# Patient Record
Sex: MALE | Race: WHITE | Employment: OTHER | URBAN - METROPOLITAN AREA
[De-identification: names, ages, dates, MRNs, and addresses within clinical notes are randomized per-mention and may not be internally consistent; named-entity substitution may affect disease eponyms.]

---

## 2020-06-07 ENCOUNTER — HOSPITAL ENCOUNTER (INPATIENT)
Age: 75
LOS: 4 days | Discharge: HOME | DRG: 871 | End: 2020-06-11
Attending: EMERGENCY MEDICINE | Admitting: STUDENT IN AN ORGANIZED HEALTH CARE EDUCATION/TRAINING PROGRAM
Payer: MEDICARE

## 2020-06-07 ENCOUNTER — APPOINTMENT (OUTPATIENT)
Dept: ULTRASOUND IMAGING | Age: 75
DRG: 871 | End: 2020-06-07
Payer: MEDICARE

## 2020-06-07 ENCOUNTER — APPOINTMENT (OUTPATIENT)
Dept: CT IMAGING | Age: 75
DRG: 871 | End: 2020-06-07
Payer: MEDICARE

## 2020-06-07 PROBLEM — K85.10 ACUTE BILIARY PANCREATITIS: Status: ACTIVE | Noted: 2020-06-07

## 2020-06-07 PROBLEM — K85.90 ACUTE PANCREATITIS WITHOUT INFECTION OR NECROSIS: Status: ACTIVE | Noted: 2020-06-07

## 2020-06-07 PROBLEM — K85.90 ACUTE PANCREATITIS: Status: ACTIVE | Noted: 2020-06-07

## 2020-06-07 LAB
ACETAMINOPHEN LEVEL: <5 MCG/ML (ref 10–30)
ALBUMIN SERPL-MCNC: 4.1 G/DL (ref 3.5–5.2)
ALP BLD-CCNC: 214 U/L (ref 40–129)
ALT SERPL-CCNC: 167 U/L (ref 0–40)
AMORPHOUS: ABNORMAL
AMPHETAMINE SCREEN, URINE: NOT DETECTED
ANION GAP SERPL CALCULATED.3IONS-SCNC: 11 MMOL/L (ref 7–16)
AST SERPL-CCNC: 221 U/L (ref 0–39)
BACTERIA: ABNORMAL /HPF
BARBITURATE SCREEN URINE: NOT DETECTED
BASOPHILS ABSOLUTE: 0 E9/L (ref 0–0.2)
BASOPHILS RELATIVE PERCENT: 0 % (ref 0–2)
BENZODIAZEPINE SCREEN, URINE: NOT DETECTED
BILIRUB SERPL-MCNC: 4.3 MG/DL (ref 0–1.2)
BILIRUBIN URINE: ABNORMAL
BLOOD, URINE: ABNORMAL
BUN BLDV-MCNC: 26 MG/DL (ref 8–23)
BURR CELLS: ABNORMAL
CALCIUM SERPL-MCNC: 9.6 MG/DL (ref 8.6–10.2)
CANNABINOID SCREEN URINE: NOT DETECTED
CHLORIDE BLD-SCNC: 104 MMOL/L (ref 98–107)
CHOLESTEROL, TOTAL: 106 MG/DL (ref 0–199)
CLARITY: CLEAR
CO2: 23 MMOL/L (ref 22–29)
COCAINE METABOLITE SCREEN URINE: NOT DETECTED
COLOR: ABNORMAL
CREAT SERPL-MCNC: 1.5 MG/DL (ref 0.7–1.2)
EOSINOPHILS ABSOLUTE: 0 E9/L (ref 0.05–0.5)
EOSINOPHILS RELATIVE PERCENT: 0 % (ref 0–6)
EPITHELIAL CELLS, UA: ABNORMAL /HPF
ETHANOL: <10 MG/DL (ref 0–0.08)
FENTANYL SCREEN, URINE: NOT DETECTED
FERRITIN: 222 NG/ML
GFR AFRICAN AMERICAN: 56
GFR NON-AFRICAN AMERICAN: 46 ML/MIN/1.73
GLUCOSE BLD-MCNC: 106 MG/DL (ref 74–99)
GLUCOSE URINE: NEGATIVE MG/DL
HCT VFR BLD CALC: 45.3 % (ref 37–54)
HDLC SERPL-MCNC: 40 MG/DL
HEMOGLOBIN: 14.6 G/DL (ref 12.5–16.5)
INR BLD: 1.3
IRON SATURATION: 7 % (ref 20–55)
IRON: 16 MCG/DL (ref 59–158)
KETONES, URINE: NEGATIVE MG/DL
LACTIC ACID, SEPSIS: 2.4 MMOL/L (ref 0.5–1.9)
LDL CHOLESTEROL CALCULATED: 49 MG/DL (ref 0–99)
LEUKOCYTE ESTERASE, URINE: NEGATIVE
LIPASE: 2004 U/L (ref 13–60)
LYMPHOCYTES ABSOLUTE: 0.14 E9/L (ref 1.5–4)
LYMPHOCYTES RELATIVE PERCENT: 3.5 % (ref 20–42)
Lab: ABNORMAL
MCH RBC QN AUTO: 28.3 PG (ref 26–35)
MCHC RBC AUTO-ENTMCNC: 32.2 % (ref 32–34.5)
MCV RBC AUTO: 88 FL (ref 80–99.9)
METHADONE SCREEN, URINE: NOT DETECTED
MONOCYTES ABSOLUTE: 0 E9/L (ref 0.1–0.95)
MONOCYTES RELATIVE PERCENT: 0 % (ref 2–12)
MUCUS: PRESENT /LPF
NEUTROPHILS ABSOLUTE: 3.3 E9/L (ref 1.8–7.3)
NEUTROPHILS RELATIVE PERCENT: 96.5 % (ref 43–80)
NITRITE, URINE: NEGATIVE
NUCLEATED RED BLOOD CELLS: 0 /100 WBC
OPIATE SCREEN URINE: POSITIVE
OXYCODONE URINE: NOT DETECTED
PDW BLD-RTO: 15.2 FL (ref 11.5–15)
PH UA: 5.5 (ref 5–9)
PHENCYCLIDINE SCREEN URINE: NOT DETECTED
PLATELET # BLD: 146 E9/L (ref 130–450)
PMV BLD AUTO: 11 FL (ref 7–12)
POIKILOCYTES: ABNORMAL
POTASSIUM SERPL-SCNC: 4.1 MMOL/L (ref 3.5–5)
PROTEIN UA: NEGATIVE MG/DL
PROTHROMBIN TIME: 16 SEC (ref 9.3–12.4)
RBC # BLD: 5.15 E12/L (ref 3.8–5.8)
RBC UA: ABNORMAL /HPF (ref 0–2)
RENAL EPITHELIAL, UA: ABNORMAL /HPF
SALICYLATE, SERUM: <0.3 MG/DL (ref 0–30)
SODIUM BLD-SCNC: 138 MMOL/L (ref 132–146)
SPECIFIC GRAVITY UA: <=1.005 (ref 1–1.03)
T4 TOTAL: 6.1 MCG/DL (ref 4.5–11.7)
TOTAL IRON BINDING CAPACITY: 234 MCG/DL (ref 250–450)
TOTAL PROTEIN: 7 G/DL (ref 6.4–8.3)
TRICYCLIC ANTIDEPRESSANTS SCREEN SERUM: NEGATIVE NG/ML
TRIGL SERPL-MCNC: 87 MG/DL (ref 0–149)
TROPONIN: <0.01 NG/ML (ref 0–0.03)
TSH SERPL DL<=0.05 MIU/L-ACNC: 0.27 UIU/ML (ref 0.27–4.2)
UROBILINOGEN, URINE: 0.2 E.U./DL
VLDLC SERPL CALC-MCNC: 17 MG/DL
WBC # BLD: 3.4 E9/L (ref 4.5–11.5)
WBC UA: ABNORMAL /HPF (ref 0–5)

## 2020-06-07 PROCEDURE — 86301 IMMUNOASSAY TUMOR CA 19-9: CPT

## 2020-06-07 PROCEDURE — 2060000000 HC ICU INTERMEDIATE R&B

## 2020-06-07 PROCEDURE — 96376 TX/PRO/DX INJ SAME DRUG ADON: CPT

## 2020-06-07 PROCEDURE — 93005 ELECTROCARDIOGRAM TRACING: CPT | Performed by: EMERGENCY MEDICINE

## 2020-06-07 PROCEDURE — 6360000002 HC RX W HCPCS: Performed by: EMERGENCY MEDICINE

## 2020-06-07 PROCEDURE — 96374 THER/PROPH/DIAG INJ IV PUSH: CPT

## 2020-06-07 PROCEDURE — 76705 ECHO EXAM OF ABDOMEN: CPT

## 2020-06-07 PROCEDURE — 85025 COMPLETE CBC W/AUTO DIFF WBC: CPT

## 2020-06-07 PROCEDURE — 83550 IRON BINDING TEST: CPT

## 2020-06-07 PROCEDURE — 80053 COMPREHEN METABOLIC PANEL: CPT

## 2020-06-07 PROCEDURE — 80074 ACUTE HEPATITIS PANEL: CPT

## 2020-06-07 PROCEDURE — 6360000004 HC RX CONTRAST MEDICATION: Performed by: RADIOLOGY

## 2020-06-07 PROCEDURE — 86038 ANTINUCLEAR ANTIBODIES: CPT

## 2020-06-07 PROCEDURE — 82728 ASSAY OF FERRITIN: CPT

## 2020-06-07 PROCEDURE — 82787 IGG 1 2 3 OR 4 EACH: CPT

## 2020-06-07 PROCEDURE — 85610 PROTHROMBIN TIME: CPT

## 2020-06-07 PROCEDURE — 74177 CT ABD & PELVIS W/CONTRAST: CPT

## 2020-06-07 PROCEDURE — 82105 ALPHA-FETOPROTEIN SERUM: CPT

## 2020-06-07 PROCEDURE — 84484 ASSAY OF TROPONIN QUANT: CPT

## 2020-06-07 PROCEDURE — 99285 EMERGENCY DEPT VISIT HI MDM: CPT

## 2020-06-07 PROCEDURE — 82103 ALPHA-1-ANTITRYPSIN TOTAL: CPT

## 2020-06-07 PROCEDURE — 6360000002 HC RX W HCPCS

## 2020-06-07 PROCEDURE — 6360000002 HC RX W HCPCS: Performed by: STUDENT IN AN ORGANIZED HEALTH CARE EDUCATION/TRAINING PROGRAM

## 2020-06-07 PROCEDURE — 96375 TX/PRO/DX INJ NEW DRUG ADDON: CPT

## 2020-06-07 PROCEDURE — 99222 1ST HOSP IP/OBS MODERATE 55: CPT | Performed by: INTERNAL MEDICINE

## 2020-06-07 PROCEDURE — 84436 ASSAY OF TOTAL THYROXINE: CPT

## 2020-06-07 PROCEDURE — 86255 FLUORESCENT ANTIBODY SCREEN: CPT

## 2020-06-07 PROCEDURE — 84443 ASSAY THYROID STIM HORMONE: CPT

## 2020-06-07 PROCEDURE — 82784 ASSAY IGA/IGD/IGG/IGM EACH: CPT

## 2020-06-07 PROCEDURE — 83690 ASSAY OF LIPASE: CPT

## 2020-06-07 PROCEDURE — 81001 URINALYSIS AUTO W/SCOPE: CPT

## 2020-06-07 PROCEDURE — 83540 ASSAY OF IRON: CPT

## 2020-06-07 PROCEDURE — 80061 LIPID PANEL: CPT

## 2020-06-07 PROCEDURE — 82378 CARCINOEMBRYONIC ANTIGEN: CPT

## 2020-06-07 PROCEDURE — 80307 DRUG TEST PRSMV CHEM ANLYZR: CPT

## 2020-06-07 PROCEDURE — 2580000003 HC RX 258: Performed by: STUDENT IN AN ORGANIZED HEALTH CARE EDUCATION/TRAINING PROGRAM

## 2020-06-07 PROCEDURE — 83605 ASSAY OF LACTIC ACID: CPT

## 2020-06-07 PROCEDURE — 36415 COLL VENOUS BLD VENIPUNCTURE: CPT

## 2020-06-07 PROCEDURE — 2580000003 HC RX 258: Performed by: EMERGENCY MEDICINE

## 2020-06-07 PROCEDURE — G0480 DRUG TEST DEF 1-7 CLASSES: HCPCS

## 2020-06-07 RX ORDER — AMLODIPINE BESYLATE 5 MG/1
5 TABLET ORAL DAILY
Status: ON HOLD | COMMUNITY
Start: 2016-05-31 | End: 2020-06-07

## 2020-06-07 RX ORDER — ACETAMINOPHEN 325 MG/1
650 TABLET ORAL EVERY 6 HOURS PRN
Status: DISCONTINUED | OUTPATIENT
Start: 2020-06-07 | End: 2020-06-11 | Stop reason: HOSPADM

## 2020-06-07 RX ORDER — ERGOCALCIFEROL 1.25 MG/1
5000 CAPSULE ORAL DAILY
COMMUNITY

## 2020-06-07 RX ORDER — SODIUM CHLORIDE 0.9 % (FLUSH) 0.9 %
10 SYRINGE (ML) INJECTION PRN
Status: DISCONTINUED | OUTPATIENT
Start: 2020-06-07 | End: 2020-06-11 | Stop reason: HOSPADM

## 2020-06-07 RX ORDER — HYDROXYCHLOROQUINE SULFATE 200 MG/1
TABLET, FILM COATED ORAL
COMMUNITY
Start: 2017-10-28

## 2020-06-07 RX ORDER — ACETAMINOPHEN 650 MG/1
650 SUPPOSITORY RECTAL EVERY 6 HOURS PRN
Status: DISCONTINUED | OUTPATIENT
Start: 2020-06-07 | End: 2020-06-11 | Stop reason: HOSPADM

## 2020-06-07 RX ORDER — 0.9 % SODIUM CHLORIDE 0.9 %
500 INTRAVENOUS SOLUTION INTRAVENOUS ONCE
Status: COMPLETED | OUTPATIENT
Start: 2020-06-07 | End: 2020-06-07

## 2020-06-07 RX ORDER — HYDROCHLOROTHIAZIDE 25 MG/1
25 TABLET ORAL DAILY
Status: ON HOLD | COMMUNITY
Start: 2016-05-31 | End: 2020-06-07

## 2020-06-07 RX ORDER — MORPHINE SULFATE 2 MG/ML
2 INJECTION, SOLUTION INTRAMUSCULAR; INTRAVENOUS
Status: DISCONTINUED | OUTPATIENT
Start: 2020-06-07 | End: 2020-06-11 | Stop reason: HOSPADM

## 2020-06-07 RX ORDER — SILDENAFIL 50 MG/1
50 TABLET, FILM COATED ORAL PRN
COMMUNITY

## 2020-06-07 RX ORDER — MORPHINE SULFATE 2 MG/ML
INJECTION, SOLUTION INTRAMUSCULAR; INTRAVENOUS
Status: COMPLETED
Start: 2020-06-07 | End: 2020-06-07

## 2020-06-07 RX ORDER — CHLORAL HYDRATE 500 MG
3000 CAPSULE ORAL DAILY
COMMUNITY

## 2020-06-07 RX ORDER — TAMSULOSIN HYDROCHLORIDE 0.4 MG/1
0.4 CAPSULE ORAL 2 TIMES DAILY
Status: ON HOLD | COMMUNITY
Start: 2018-08-01 | End: 2020-06-07

## 2020-06-07 RX ORDER — SODIUM CHLORIDE, SODIUM LACTATE, POTASSIUM CHLORIDE, CALCIUM CHLORIDE 600; 310; 30; 20 MG/100ML; MG/100ML; MG/100ML; MG/100ML
INJECTION, SOLUTION INTRAVENOUS CONTINUOUS
Status: DISCONTINUED | OUTPATIENT
Start: 2020-06-07 | End: 2020-06-10 | Stop reason: ALTCHOICE

## 2020-06-07 RX ORDER — HEPARIN SODIUM 10000 [USP'U]/ML
5000 INJECTION, SOLUTION INTRAVENOUS; SUBCUTANEOUS EVERY 8 HOURS SCHEDULED
Status: DISCONTINUED | OUTPATIENT
Start: 2020-06-07 | End: 2020-06-11 | Stop reason: HOSPADM

## 2020-06-07 RX ORDER — MORPHINE SULFATE 2 MG/ML
2 INJECTION, SOLUTION INTRAMUSCULAR; INTRAVENOUS ONCE
Status: COMPLETED | OUTPATIENT
Start: 2020-06-07 | End: 2020-06-07

## 2020-06-07 RX ORDER — PANTOPRAZOLE SODIUM 40 MG/1
40 TABLET, DELAYED RELEASE ORAL
Status: DISCONTINUED | OUTPATIENT
Start: 2020-06-08 | End: 2020-06-08

## 2020-06-07 RX ORDER — DICLOFENAC SODIUM 75 MG/1
75 TABLET, DELAYED RELEASE ORAL DAILY
COMMUNITY

## 2020-06-07 RX ORDER — MORPHINE SULFATE 4 MG/ML
4 INJECTION, SOLUTION INTRAMUSCULAR; INTRAVENOUS
Status: DISCONTINUED | OUTPATIENT
Start: 2020-06-07 | End: 2020-06-11 | Stop reason: HOSPADM

## 2020-06-07 RX ORDER — SODIUM CHLORIDE 0.9 % (FLUSH) 0.9 %
10 SYRINGE (ML) INJECTION EVERY 12 HOURS SCHEDULED
Status: DISCONTINUED | OUTPATIENT
Start: 2020-06-07 | End: 2020-06-11 | Stop reason: HOSPADM

## 2020-06-07 RX ORDER — 0.9 % SODIUM CHLORIDE 0.9 %
1000 INTRAVENOUS SOLUTION INTRAVENOUS ONCE
Status: COMPLETED | OUTPATIENT
Start: 2020-06-07 | End: 2020-06-07

## 2020-06-07 RX ORDER — OMEPRAZOLE 20 MG/1
20 CAPSULE, DELAYED RELEASE ORAL DAILY
COMMUNITY

## 2020-06-07 RX ORDER — PSEUDOEPHEDRINE HCL 30 MG
1000 TABLET ORAL DAILY
COMMUNITY

## 2020-06-07 RX ORDER — ONDANSETRON 2 MG/ML
4 INJECTION INTRAMUSCULAR; INTRAVENOUS ONCE
Status: COMPLETED | OUTPATIENT
Start: 2020-06-07 | End: 2020-06-07

## 2020-06-07 RX ORDER — ONDANSETRON 2 MG/ML
4 INJECTION INTRAMUSCULAR; INTRAVENOUS EVERY 6 HOURS PRN
Status: DISCONTINUED | OUTPATIENT
Start: 2020-06-07 | End: 2020-06-11 | Stop reason: HOSPADM

## 2020-06-07 RX ORDER — OXYCODONE HYDROCHLORIDE 5 MG/1
5 CAPSULE ORAL EVERY 4 HOURS PRN
COMMUNITY

## 2020-06-07 RX ORDER — LISINOPRIL 40 MG/1
TABLET ORAL
COMMUNITY
Start: 2016-05-31

## 2020-06-07 RX ORDER — MULTIVITAMIN WITH IRON
250 TABLET ORAL DAILY
COMMUNITY

## 2020-06-07 RX ORDER — PROMETHAZINE HYDROCHLORIDE 25 MG/1
12.5 TABLET ORAL EVERY 6 HOURS PRN
Status: DISCONTINUED | OUTPATIENT
Start: 2020-06-07 | End: 2020-06-11 | Stop reason: HOSPADM

## 2020-06-07 RX ORDER — VITAMIN E 268 MG
400 CAPSULE ORAL DAILY
COMMUNITY

## 2020-06-07 RX ADMIN — MORPHINE SULFATE 2 MG: 2 INJECTION, SOLUTION INTRAMUSCULAR; INTRAVENOUS at 16:37

## 2020-06-07 RX ADMIN — SODIUM CHLORIDE 500 ML: 9 INJECTION, SOLUTION INTRAVENOUS at 13:44

## 2020-06-07 RX ADMIN — SODIUM CHLORIDE 1000 ML: 9 INJECTION, SOLUTION INTRAVENOUS at 14:49

## 2020-06-07 RX ADMIN — SODIUM CHLORIDE, POTASSIUM CHLORIDE, SODIUM LACTATE AND CALCIUM CHLORIDE: 600; 310; 30; 20 INJECTION, SOLUTION INTRAVENOUS at 18:42

## 2020-06-07 RX ADMIN — MORPHINE SULFATE 2 MG: 2 INJECTION, SOLUTION INTRAMUSCULAR; INTRAVENOUS at 19:15

## 2020-06-07 RX ADMIN — MORPHINE SULFATE 2 MG: 2 INJECTION, SOLUTION INTRAMUSCULAR; INTRAVENOUS at 13:51

## 2020-06-07 RX ADMIN — HEPARIN SODIUM 5000 UNITS: 10000 INJECTION INTRAVENOUS; SUBCUTANEOUS at 21:11

## 2020-06-07 RX ADMIN — IOPAMIDOL 110 ML: 755 INJECTION, SOLUTION INTRAVENOUS at 15:00

## 2020-06-07 RX ADMIN — ONDANSETRON 4 MG: 2 INJECTION INTRAMUSCULAR; INTRAVENOUS at 13:51

## 2020-06-07 RX ADMIN — SODIUM CHLORIDE, PRESERVATIVE FREE 10 ML: 5 INJECTION INTRAVENOUS at 21:03

## 2020-06-07 ASSESSMENT — PAIN DESCRIPTION - ORIENTATION
ORIENTATION: MID
ORIENTATION: ANTERIOR

## 2020-06-07 ASSESSMENT — PAIN SCALES - GENERAL
PAINLEVEL_OUTOF10: 6
PAINLEVEL_OUTOF10: 5
PAINLEVEL_OUTOF10: 6
PAINLEVEL_OUTOF10: 2
PAINLEVEL_OUTOF10: 3
PAINLEVEL_OUTOF10: 6

## 2020-06-07 ASSESSMENT — PAIN DESCRIPTION - FREQUENCY: FREQUENCY: INTERMITTENT

## 2020-06-07 ASSESSMENT — PAIN DESCRIPTION - DESCRIPTORS
DESCRIPTORS: SHARP
DESCRIPTORS: SHARP

## 2020-06-07 ASSESSMENT — PAIN DESCRIPTION - LOCATION
LOCATION: ABDOMEN
LOCATION: ABDOMEN

## 2020-06-07 ASSESSMENT — PAIN - FUNCTIONAL ASSESSMENT: PAIN_FUNCTIONAL_ASSESSMENT: ACTIVITIES ARE NOT PREVENTED

## 2020-06-07 ASSESSMENT — PAIN DESCRIPTION - PAIN TYPE
TYPE: ACUTE PAIN
TYPE: ACUTE PAIN

## 2020-06-07 NOTE — ED PROVIDER NOTES
HPI:  6/7/20,   Time: 1:31 PM EDT       Guille Cordova is a 69 y.o. male presenting to the ED for abdominal pain, beginning 2 days ago. The complaint has been intermittent, moderate in severity, and worsened by nothing. Patient is a very pleasant 72-year-old gentleman with no past medical history. No present previous abdominal surgeries; only previous knee surgeries. He states that beginning 2 days ago he began to have some crampy mid periumbilical abdominal pain felt a little bit bloated states the symptoms seem to wax and wane after dinner and got better in the middle of the night after he vomited. The next day he thought he developed a low-grade fever still has some cramping abdominal pain went to another hospital he states it was [facility].  He was worked up therapy did not had any further imaging they thought he had a \"stomach bug\" and he was discharged home. He was feeling better and then last night began having nausea and vomiting again. He had one bout of diarrhea this morning. He states the pain at this time is about a 5 out of 10 and periumbilical in nature. He has not had any fevers in the past 24 hours. He denies any urinary complaints. No dysuria urgency or frequency. He denies any back pain or flank pain. He denies any chest pain or shortness of breath. Review of Systems:   Pertinent positives and negatives are stated within HPI, all other systems reviewed and are negative.          --------------------------------------------- PAST HISTORY ---------------------------------------------  Past Medical History:  has a past medical history of Arthritis and Hypertension. Past Surgical History:  has a past surgical history that includes Tonsillectomy; fracture surgery; joint replacement; and eye surgery. Social History:  reports that he has quit smoking. He has never used smokeless tobacco. He reports previous alcohol use. He reports previous drug use.     Family History: Poikilocytes 1+     Bolivar Cells 1+    Microscopic Urinalysis   Result Value Ref Range    Mucus, UA Present (A) None Seen /LPF    WBC, UA 0-1 0 - 5 /HPF    RBC, UA 1-3 0 - 2 /HPF    Epithelial Cells, UA FEW /HPF    Renal Epithelial, UA RARE /HPF    Bacteria, UA MODERATE (A) None Seen /HPF    Amorphous, UA MODERATE    Urine Drug Screen   Result Value Ref Range    Drug Screen Comment: see below        RADIOLOGY:  Interpreted by Radiologist.  US GALLBLADDER RUQ   Final Result   Moderate distention of the gallbladder with out evidence of   cholecystitis. CT ABDOMEN PELVIS W IV CONTRAST Additional Contrast? None   Final Result         1. Findings suggest acute pancreatitis. Clinical correlation   recommended. 2. Prominent diameter of the common bile duct. No evidence of   calcified choledocholithiasis or pancreatic head mass. MRCP may be   helpful for further evaluation. 3. Nonspecific periportal edema. Some etiologies include acute   hepatitis or cholangitis. 4. Subtle fat stranding is seen surrounding the gallbladder. Clinical   correlation recommended for possibility of cholecystitis. 5. Severe diverticulosis involving sigmoid colon. No evidence of acute   diverticulitis. ALERT:  THIS IS AN ABNORMAL REPORT          EKG: This EKG is signed and interpreted by the EP. Time: 13:55  Rate: 100  Rhythm: Sinus  Interpretation: no acute changes; no st changes  Comparison: no previous EKG      ------------------------- NURSING NOTES AND VITALS REVIEWED ---------------------------   The nursing notes within the ED encounter and vital signs as below have been reviewed by myself. BP 99/65   Pulse 103   Temp 98 °F (36.7 °C) (Oral)   Resp 20   Ht 5' 11\" (1.803 m)   Wt 185 lb (83.9 kg)   SpO2 95%   BMI 25.80 kg/m²   Oxygen Saturation Interpretation: Normal    The patients available past medical records and past encounters were reviewed.         ------------------------------ ED without infection or necrosis, unspecified pancreatitis type Stable   2. Elevated LFTs        DISPOSITION  Disposition: Admit to telemetry  Patient condition is stable    NOTE: This report was transcribed using voice recognition software.  Every effort was made to ensure accuracy; however, inadvertent computerized transcription errors may be present     Ruma Ndiaye MD  06/07/20 2158

## 2020-06-07 NOTE — CONSULTS
Gastroenterology Consult Note   Luis Borrego ACNS-BC with Yeny Zuniga M.D. Consult Note        Date of Service: 6/8/2020  Reason for Consult: acute pancreatitis  Requesting Physician: Dr Annabelle Sandoval: Abdominal pain, nausea, vomiting, chills, and fever    History Obtained From:  patient, electronic medical record    HISTORY OF PRESENT ILLNESS:       Ana Kumar is a 69 y.o. male with significant past medical history of hypertension and arthritis admitted via ED for abdominal pain, nausea, vomiting, and low grade fever. Pt reports last Friday he ate a lot of fried food for dinner. Around 7:30 PM he developed \"serious pain\" from his umbilicus to his epigastric region described as a cement block sitting on there that lasted 1.5 hours where \"the pain went crazy, then it eased up then it would come back. Around 2:30 AM I threw up and I felt better thinking maybe I had food poisoning. Went to bed, slept and on Saturday and was fairly good but then all of a sudden my temperature was up to 100.3 °F.\"  Patient states he went to urgent care and was told he had a possible blockage so he went to [facility] emergency department for evaluation where he states his labs showed an elevated WBC however no imaging was done and he was discharged home. Patient states on Sunday morning he developed the same type of pain, and by noon he was shivering, freezing, and his pain was significant pressure like a cement block on his abdomen rated 10/10. His wife took his temperature and it was 103 °F so he was brought here for evaluation. States his BMs are normally once a day and brown, he states his last BM was Pastor morning around 1030 which was a normal bowel movement, then around 11:15 AM he had a bout of diarrhea which was brown in color and has not had a BM since. Patient states his emesis was return of food, he denies any coffee-ground or hematemesis. Patient reports no hematochezia, melena, or weight loss. Intravenous, BID **AND** sodium chloride (PF) 0.9 % injection 10 mL, 10 mL, Intravenous, BID  piperacillin-tazobactam (ZOSYN) 3.375 g in dextrose 5 % 50 mL IVPB extended infusion (mini-bag), 3.375 g, Intravenous, Q8H **AND** 0.9 % sodium chloride infusion, , Intravenous, Q8H  sodium chloride flush 0.9 % injection 10 mL, 10 mL, Intravenous, 2 times per day  sodium chloride flush 0.9 % injection 10 mL, 10 mL, Intravenous, PRN  acetaminophen (TYLENOL) tablet 650 mg, 650 mg, Oral, Q6H PRN **OR** acetaminophen (TYLENOL) suppository 650 mg, 650 mg, Rectal, Q6H PRN  magnesium hydroxide (MILK OF MAGNESIA) 400 MG/5ML suspension 30 mL, 30 mL, Oral, Daily PRN  promethazine (PHENERGAN) tablet 12.5 mg, 12.5 mg, Oral, Q6H PRN **OR** ondansetron (ZOFRAN) injection 4 mg, 4 mg, Intravenous, Q6H PRN  lactated ringers infusion, , Intravenous, Continuous  heparin (porcine) injection 5,000 Units, 5,000 Units, Subcutaneous, 3 times per day  morphine (PF) injection 2 mg, 2 mg, Intravenous, Q2H PRN **OR** morphine sulfate (PF) injection 4 mg, 4 mg, Intravenous, Q2H PRN    Allergies:  Patient has no known allergies. Social History:    Tobacco:  Pt reports he quit smoking cigarettes 2002, prior he smoked 1 PPD for 20 years. Alcohol:  Pt reports he drinks an occasional glass of wine. Illicit Drugs: Pt denies. Family History: Mother  at the age of 80 from natural causes. Father  at the age of 68 from a brain tumor that extended to his pituitary gland, it was benign. He had arthritis. 1 brother living and healthy outside of colon polyps. 2 children living and healthy. REVIEW OF SYSTEMS:    Aside from what was mentioned in the PMH and HPI, essentially unremarkable, all others negative.     PHYSICAL EXAM:      Vitals:    /76   Pulse 79   Temp 98.1 °F (36.7 °C) (Oral)   Resp 18   Ht 5' 11\" (1.803 m)   Wt 185 lb 11.2 oz (84.2 kg)   SpO2 97%   BMI 25.90 kg/m²       CONSTITUTIONAL:  awake, alert, Lip daily  · Supportive care  · Continue to monitor    Note: This report was completed utilizing computer voice recognition software. Every effort has been made to ensure accuracy, however; inadvertent computerized transcription errors may be present. Thank you very much for your consultation. We will follow closely with you.     Discussed with Dr. Vincent Ramos developed by Dr. Miguel Angel Nicole [id number], FNP-BC 6/8/2020 12:18 PM for Dr. Yeny Denney

## 2020-06-08 ENCOUNTER — ANESTHESIA EVENT (OUTPATIENT)
Dept: ENDOSCOPY | Age: 75
DRG: 871 | End: 2020-06-08
Payer: MEDICARE

## 2020-06-08 ENCOUNTER — APPOINTMENT (OUTPATIENT)
Dept: MRI IMAGING | Age: 75
DRG: 871 | End: 2020-06-08
Payer: MEDICARE

## 2020-06-08 LAB
ALBUMIN SERPL-MCNC: 3.3 G/DL (ref 3.5–5.2)
ALP BLD-CCNC: 196 U/L (ref 40–129)
ALT SERPL-CCNC: 167 U/L (ref 0–40)
AMYLASE: 475 U/L (ref 20–100)
ANION GAP SERPL CALCULATED.3IONS-SCNC: 12 MMOL/L (ref 7–16)
ANTI-MITOCHONDRIAL AB, IFA: NEGATIVE
ANTI-NUCLEAR ANTIBODY (ANA): NEGATIVE
AST SERPL-CCNC: 154 U/L (ref 0–39)
BASOPHILS ABSOLUTE: 0.03 E9/L (ref 0–0.2)
BASOPHILS RELATIVE PERCENT: 0.2 % (ref 0–2)
BILIRUB SERPL-MCNC: 4.6 MG/DL (ref 0–1.2)
BUN BLDV-MCNC: 29 MG/DL (ref 8–23)
CALCIUM SERPL-MCNC: 8.9 MG/DL (ref 8.6–10.2)
CEA: 1.8 NG/ML (ref 0–5.2)
CHLORIDE BLD-SCNC: 106 MMOL/L (ref 98–107)
CO2: 21 MMOL/L (ref 22–29)
CREAT SERPL-MCNC: 1.4 MG/DL (ref 0.7–1.2)
DOHLE BODIES: ABNORMAL
EOSINOPHILS ABSOLUTE: 0.12 E9/L (ref 0.05–0.5)
EOSINOPHILS RELATIVE PERCENT: 0.6 % (ref 0–6)
GFR AFRICAN AMERICAN: >60
GFR NON-AFRICAN AMERICAN: 50 ML/MIN/1.73
GLUCOSE BLD-MCNC: 115 MG/DL (ref 74–99)
HAV IGM SER IA-ACNC: NORMAL
HCT VFR BLD CALC: 41.8 % (ref 37–54)
HEMOGLOBIN: 13.5 G/DL (ref 12.5–16.5)
HEPATITIS B CORE IGM ANTIBODY: NORMAL
HEPATITIS B SURFACE ANTIGEN INTERPRETATION: NORMAL
HEPATITIS C ANTIBODY INTERPRETATION: NORMAL
IGG: 717 MG/DL (ref 700–1600)
IGM: 85 MG/DL (ref 40–230)
IMMATURE GRANULOCYTES #: 0.69 E9/L
IMMATURE GRANULOCYTES %: 3.6 % (ref 0–5)
LIPASE: 379 U/L (ref 13–60)
LYMPHOCYTES ABSOLUTE: 0.44 E9/L (ref 1.5–4)
LYMPHOCYTES RELATIVE PERCENT: 2.3 % (ref 20–42)
MCH RBC QN AUTO: 28.5 PG (ref 26–35)
MCHC RBC AUTO-ENTMCNC: 32.3 % (ref 32–34.5)
MCV RBC AUTO: 88.2 FL (ref 80–99.9)
MONOCYTES ABSOLUTE: 1.21 E9/L (ref 0.1–0.95)
MONOCYTES RELATIVE PERCENT: 6.3 % (ref 2–12)
NEUTROPHILS ABSOLUTE: 16.67 E9/L (ref 1.8–7.3)
NEUTROPHILS RELATIVE PERCENT: 87 % (ref 43–80)
OVALOCYTES: ABNORMAL
PDW BLD-RTO: 15.7 FL (ref 11.5–15)
PLATELET # BLD: 126 E9/L (ref 130–450)
PMV BLD AUTO: 12.1 FL (ref 7–12)
POIKILOCYTES: ABNORMAL
POTASSIUM SERPL-SCNC: 4.3 MMOL/L (ref 3.5–5)
RBC # BLD: 4.74 E12/L (ref 3.8–5.8)
SODIUM BLD-SCNC: 139 MMOL/L (ref 132–146)
TOTAL PROTEIN: 5.9 G/DL (ref 6.4–8.3)
WBC # BLD: 19.2 E9/L (ref 4.5–11.5)

## 2020-06-08 PROCEDURE — 82150 ASSAY OF AMYLASE: CPT

## 2020-06-08 PROCEDURE — C9113 INJ PANTOPRAZOLE SODIUM, VIA: HCPCS | Performed by: INTERNAL MEDICINE

## 2020-06-08 PROCEDURE — 86255 FLUORESCENT ANTIBODY SCREEN: CPT

## 2020-06-08 PROCEDURE — 99233 SBSQ HOSP IP/OBS HIGH 50: CPT | Performed by: INTERNAL MEDICINE

## 2020-06-08 PROCEDURE — 83690 ASSAY OF LIPASE: CPT

## 2020-06-08 PROCEDURE — C9113 INJ PANTOPRAZOLE SODIUM, VIA: HCPCS

## 2020-06-08 PROCEDURE — 87150 DNA/RNA AMPLIFIED PROBE: CPT

## 2020-06-08 PROCEDURE — 87040 BLOOD CULTURE FOR BACTERIA: CPT

## 2020-06-08 PROCEDURE — 87077 CULTURE AEROBIC IDENTIFY: CPT

## 2020-06-08 PROCEDURE — 86256 FLUORESCENT ANTIBODY TITER: CPT

## 2020-06-08 PROCEDURE — 2060000000 HC ICU INTERMEDIATE R&B

## 2020-06-08 PROCEDURE — 80053 COMPREHEN METABOLIC PANEL: CPT

## 2020-06-08 PROCEDURE — 6360000002 HC RX W HCPCS: Performed by: INTERNAL MEDICINE

## 2020-06-08 PROCEDURE — 36415 COLL VENOUS BLD VENIPUNCTURE: CPT

## 2020-06-08 PROCEDURE — 2580000003 HC RX 258: Performed by: CLINICAL NURSE SPECIALIST

## 2020-06-08 PROCEDURE — 6360000002 HC RX W HCPCS

## 2020-06-08 PROCEDURE — 2580000003 HC RX 258: Performed by: STUDENT IN AN ORGANIZED HEALTH CARE EDUCATION/TRAINING PROGRAM

## 2020-06-08 PROCEDURE — 6360000002 HC RX W HCPCS: Performed by: STUDENT IN AN ORGANIZED HEALTH CARE EDUCATION/TRAINING PROGRAM

## 2020-06-08 PROCEDURE — 6360000002 HC RX W HCPCS: Performed by: CLINICAL NURSE SPECIALIST

## 2020-06-08 PROCEDURE — 85025 COMPLETE CBC W/AUTO DIFF WBC: CPT

## 2020-06-08 PROCEDURE — 74181 MRI ABDOMEN W/O CONTRAST: CPT

## 2020-06-08 PROCEDURE — 87186 SC STD MICRODIL/AGAR DIL: CPT

## 2020-06-08 RX ORDER — SODIUM CHLORIDE 9 MG/ML
INJECTION, SOLUTION INTRAVENOUS EVERY 8 HOURS
Status: DISCONTINUED | OUTPATIENT
Start: 2020-06-08 | End: 2020-06-10

## 2020-06-08 RX ORDER — SODIUM CHLORIDE 9 MG/ML
10 INJECTION INTRAVENOUS 2 TIMES DAILY
Status: DISCONTINUED | OUTPATIENT
Start: 2020-06-08 | End: 2020-06-11 | Stop reason: HOSPADM

## 2020-06-08 RX ORDER — PANTOPRAZOLE SODIUM 40 MG/10ML
INJECTION, POWDER, LYOPHILIZED, FOR SOLUTION INTRAVENOUS
Status: COMPLETED
Start: 2020-06-08 | End: 2020-06-08

## 2020-06-08 RX ORDER — PANTOPRAZOLE SODIUM 40 MG/10ML
40 INJECTION, POWDER, LYOPHILIZED, FOR SOLUTION INTRAVENOUS 2 TIMES DAILY
Status: DISCONTINUED | OUTPATIENT
Start: 2020-06-08 | End: 2020-06-11 | Stop reason: HOSPADM

## 2020-06-08 RX ORDER — SODIUM CHLORIDE, SODIUM LACTATE, POTASSIUM CHLORIDE, AND CALCIUM CHLORIDE .6; .31; .03; .02 G/100ML; G/100ML; G/100ML; G/100ML
1000 INJECTION, SOLUTION INTRAVENOUS ONCE
Status: COMPLETED | OUTPATIENT
Start: 2020-06-09 | End: 2020-06-09

## 2020-06-08 RX ADMIN — MORPHINE SULFATE 2 MG: 2 INJECTION, SOLUTION INTRAMUSCULAR; INTRAVENOUS at 04:16

## 2020-06-08 RX ADMIN — MORPHINE SULFATE 2 MG: 2 INJECTION, SOLUTION INTRAMUSCULAR; INTRAVENOUS at 07:49

## 2020-06-08 RX ADMIN — PIPERACILLIN AND TAZOBACTAM 3.38 G: 3; .375 INJECTION, POWDER, FOR SOLUTION INTRAVENOUS at 18:05

## 2020-06-08 RX ADMIN — SODIUM CHLORIDE, PRESERVATIVE FREE 10 ML: 5 INJECTION INTRAVENOUS at 10:45

## 2020-06-08 RX ADMIN — SODIUM CHLORIDE, POTASSIUM CHLORIDE, SODIUM LACTATE AND CALCIUM CHLORIDE: 600; 310; 30; 20 INJECTION, SOLUTION INTRAVENOUS at 22:32

## 2020-06-08 RX ADMIN — SODIUM CHLORIDE, PRESERVATIVE FREE 10 ML: 5 INJECTION INTRAVENOUS at 20:53

## 2020-06-08 RX ADMIN — MORPHINE SULFATE 4 MG: 4 INJECTION, SOLUTION INTRAMUSCULAR; INTRAVENOUS at 23:54

## 2020-06-08 RX ADMIN — PIPERACILLIN AND TAZOBACTAM 3.38 G: 3; .375 INJECTION, POWDER, FOR SOLUTION INTRAVENOUS at 10:34

## 2020-06-08 RX ADMIN — PANTOPRAZOLE SODIUM 40 MG: 40 INJECTION, POWDER, FOR SOLUTION INTRAVENOUS at 06:00

## 2020-06-08 RX ADMIN — MORPHINE SULFATE 4 MG: 4 INJECTION, SOLUTION INTRAMUSCULAR; INTRAVENOUS at 13:52

## 2020-06-08 RX ADMIN — MORPHINE SULFATE 2 MG: 2 INJECTION, SOLUTION INTRAMUSCULAR; INTRAVENOUS at 10:44

## 2020-06-08 RX ADMIN — HEPARIN SODIUM 5000 UNITS: 10000 INJECTION INTRAVENOUS; SUBCUTANEOUS at 05:50

## 2020-06-08 RX ADMIN — SODIUM CHLORIDE, POTASSIUM CHLORIDE, SODIUM LACTATE AND CALCIUM CHLORIDE: 600; 310; 30; 20 INJECTION, SOLUTION INTRAVENOUS at 07:44

## 2020-06-08 RX ADMIN — PANTOPRAZOLE SODIUM 40 MG: 40 INJECTION, POWDER, FOR SOLUTION INTRAVENOUS at 20:53

## 2020-06-08 RX ADMIN — SODIUM CHLORIDE: 9 INJECTION, SOLUTION INTRAVENOUS at 21:30

## 2020-06-08 RX ADMIN — MORPHINE SULFATE 2 MG: 2 INJECTION, SOLUTION INTRAMUSCULAR; INTRAVENOUS at 20:53

## 2020-06-08 RX ADMIN — SODIUM CHLORIDE: 9 INJECTION, SOLUTION INTRAVENOUS at 14:14

## 2020-06-08 RX ADMIN — HEPARIN SODIUM 5000 UNITS: 10000 INJECTION INTRAVENOUS; SUBCUTANEOUS at 13:58

## 2020-06-08 ASSESSMENT — PAIN DESCRIPTION - PROGRESSION
CLINICAL_PROGRESSION: NOT CHANGED

## 2020-06-08 ASSESSMENT — PAIN SCALES - GENERAL
PAINLEVEL_OUTOF10: 0
PAINLEVEL_OUTOF10: 4
PAINLEVEL_OUTOF10: 4
PAINLEVEL_OUTOF10: 3
PAINLEVEL_OUTOF10: 2
PAINLEVEL_OUTOF10: 5
PAINLEVEL_OUTOF10: 0
PAINLEVEL_OUTOF10: 1
PAINLEVEL_OUTOF10: 6
PAINLEVEL_OUTOF10: 3
PAINLEVEL_OUTOF10: 3
PAINLEVEL_OUTOF10: 7

## 2020-06-08 ASSESSMENT — PAIN - FUNCTIONAL ASSESSMENT
PAIN_FUNCTIONAL_ASSESSMENT: ACTIVITIES ARE NOT PREVENTED

## 2020-06-08 ASSESSMENT — PAIN DESCRIPTION - ORIENTATION
ORIENTATION: MID

## 2020-06-08 ASSESSMENT — PAIN DESCRIPTION - LOCATION
LOCATION: ABDOMEN

## 2020-06-08 ASSESSMENT — PAIN DESCRIPTION - DESCRIPTORS
DESCRIPTORS: ACHING;SHARP

## 2020-06-08 ASSESSMENT — PAIN DESCRIPTION - FREQUENCY
FREQUENCY: INTERMITTENT

## 2020-06-08 ASSESSMENT — PAIN DESCRIPTION - PAIN TYPE
TYPE: ACUTE PAIN

## 2020-06-08 ASSESSMENT — LIFESTYLE VARIABLES: SMOKING_STATUS: 0

## 2020-06-08 NOTE — PROGRESS NOTES
[facility] Hospital[facility]   Progress Note    Admitting Date and Time: 6/7/2020  1:19 PM  Admit Dx: Acute pancreatitis without infection or necrosis [K85.90]  Acute pancreatitis without infection or necrosis [K85.90]    Subjective: Patient came to ER on seventh with complaint of abdominal pain as well as vomiting. History of hypertension, inflammatory polyarthropathy, work-up in ER had shown enlarged gallbladder as well as evidence of pancreatitis. BHUPINDER on admission. Patient was admitted with Acute pancreatitis without infection or necrosis [K85.90]  Acute pancreatitis without infection or necrosis [K85.90]. Patient is awake, alert, comfortable, does have pain in abdomen, however controlled with the current morphine regimen. Pleasant and denies any complaints. Per RN: Blood cultures were done. ROS: denies fever, chills, cp, sob, n/v, HA unless stated above.      pantoprazole  40 mg Intravenous BID    And    sodium chloride (PF)  10 mL Intravenous BID    piperacillin-tazobactam  3.375 g Intravenous Q8H    sodium chloride flush  10 mL Intravenous 2 times per day    heparin (porcine)  5,000 Units Subcutaneous 3 times per day     sodium chloride flush, 10 mL, PRN  acetaminophen, 650 mg, Q6H PRN    Or  acetaminophen, 650 mg, Q6H PRN  magnesium hydroxide, 30 mL, Daily PRN  promethazine, 12.5 mg, Q6H PRN    Or  ondansetron, 4 mg, Q6H PRN  morphine, 2 mg, Q2H PRN    Or  morphine, 4 mg, Q2H PRN         Objective:    /76   Pulse 79   Temp 98.1 °F (36.7 °C) (Oral)   Resp 18   Ht 5' 11\" (1.803 m)   Wt 185 lb 11.2 oz (84.2 kg)   SpO2 97%   BMI 25.90 kg/m²   General Appearance: alert and oriented to person, place and time, well-developed and well-nourished, in no acute distress  Skin: warm and dry, no rash or erythema  Head: normocephalic and atraumatic  Eyes: pupils equal, round, and reactive to light, extraocular eye movements intact, conjunctivae normal  ENT: tympanic membrane, external ear and ear canal normal bilaterally, oropharynx clear and moist with normal mucous membranes  Neck: neck supple and non tender without mass, no thyromegaly or thyroid nodules, no cervical lymphadenopathy   Pulmonary/Chest: clear to auscultation bilaterally- no wheezes, rales or rhonchi, normal air movement, no respiratory distress  Cardiovascular: normal rate, normal S1 and S2, no gallops, intact distal pulses and no carotid bruits  Abdomen: soft, non-tender, non-distended, normal bowel sounds, no masses or organomegaly      Recent Labs     06/07/20  1336 06/08/20  0413    139   K 4.1 4.3    106   CO2 23 21*   BUN 26* 29*   CREATININE 1.5* 1.4*   GLUCOSE 106* 115*   CALCIUM 9.6 8.9       Recent Labs     06/07/20  1336 06/08/20  0413   WBC 3.4* 19.2*   RBC 5.15 4.74   HGB 14.6 13.5   HCT 45.3 41.8   MCV 88.0 88.2   MCH 28.3 28.5   MCHC 32.2 32.3   RDW 15.2* 15.7*    126*   MPV 11.0 12.1*       Lipase 379 down from 2000  Amylase 475  Tox screen positive for opiates on admission  WBC 19.2  Iron saturation 7/TIBC 234  Hepatitis screen negative  Results of MRI pending    Radiology:   US GALLBLADDER RUQ   Final Result   Moderate distention of the gallbladder with out evidence of   cholecystitis. CT ABDOMEN PELVIS W IV CONTRAST Additional Contrast? None   Final Result         1. Findings suggest acute pancreatitis. Clinical correlation   recommended. 2. Prominent diameter of the common bile duct. No evidence of   calcified choledocholithiasis or pancreatic head mass. MRCP may be   helpful for further evaluation. 3. Nonspecific periportal edema. Some etiologies include acute   hepatitis or cholangitis. 4. Subtle fat stranding is seen surrounding the gallbladder. Clinical   correlation recommended for possibility of cholecystitis. 5. Severe diverticulosis involving sigmoid colon. No evidence of acute   diverticulitis.       ALERT:  THIS IS AN ABNORMAL REPORT      MRI

## 2020-06-08 NOTE — CARE COORDINATION
Chart reviewed- discussed with charge RN. ID following- currently on IV zosyn. GI following and planning ERCP tomorrow. Case management and social work to continue to follow to assist with transition of care.

## 2020-06-08 NOTE — CONSULTS
name: None    Number of children: None    Years of education: None    Highest education level: None   Occupational History    None   Social Needs    Financial resource strain: None    Food insecurity     Worry: None     Inability: None    Transportation needs     Medical: None     Non-medical: None   Tobacco Use    Smoking status: Former Smoker    Smokeless tobacco: Never Used   Substance and Sexual Activity    Alcohol use: Not Currently    Drug use: Not Currently    Sexual activity: None   Lifestyle    Physical activity     Days per week: None     Minutes per session: None    Stress: None   Relationships    Social connections     Talks on phone: None     Gets together: None     Attends L[employer] service: None     Active member of club or organization: None     Attends meetings of clubs or organizations: None     Relationship status: None    Intimate partner violence     Fear of current or ex partner: None     Emotionally abused: None     Physically abused: None     Forced sexual activity: None   Other Topics Concern    None   Social History Narrative    None      Pets: Dogs and cats  Travel: No  The patient lives at home with his wife. He retired as a  in [place]    Family History:   History reviewed. No pertinent family history. . Otherwise non-pertinent to the chief complaint. REVIEW OF SYSTEMS:    Constitutional: Positive for fevers, chills, diaphoresis  Neurologic: Negative   Psychiatric: Negative  Rheumatologic: Negative   Endocrine: Negative  Hematologic: Negative  Immunologic: Negative  ENT: Negative  Respiratory: Negative   Cardiovascular: Negative  GI: As in the HPI  : Negative  Musculoskeletal: Negative  Skin: No rashes.      PHYSICAL EXAM:    Vitals:   /76   Pulse 79   Temp 98.1 °F (36.7 °C) (Oral)   Resp 18   Ht 5' 11\" (1.803 m)   Wt 185 lb 11.2 oz (84.2 kg)   SpO2 97%   BMI 25.90 kg/m²   Constitutional: The patient is awake, alert, and oriented. No distress. Looks somewhat ill, however. Skin: Warm and dry. No rashes were noted. HEENT: Eyes show round, and reactive pupils. No jaundice. Moist mucous membranes, no ulcerations, no thrush. Neck: Supple to movements. No lymphadenopathy. Chest: No use of accessory muscles to breathe. Symmetrical expansion. Auscultation reveals no wheezing, crackles, or rhonchi. Cardiovascular: S1 and S2 are rhythmic and regular. No murmurs appreciated. Abdomen: Positive bowel sounds to auscultation. Diffusely tender to palpation, especially epigastric and right upper quadrant. No rebound tenderness. No masses were felt. Extremities: No clubbing, no cyanosis, no edema. Bilateral TKR surgical wounds well-healed. No effusion.   Lines: peripheral      CBC+dif:  Recent Labs     06/07/20  1336 06/08/20  0413   WBC 3.4* 19.2*   HGB 14.6 13.5   HCT 45.3 41.8   MCV 88.0 88.2    126*   NEUTROABS 3.30 16.67*     No results found for: CRP   No results found for: CRPHS  No results found for: SEDRATE  Lab Results   Component Value Date     (H) 06/08/2020     (H) 06/08/2020    ALKPHOS 196 (H) 06/08/2020    BILITOT 4.6 (H) 06/08/2020     Lab Results   Component Value Date     06/08/2020    K 4.3 06/08/2020     06/08/2020    CO2 21 06/08/2020    BUN 29 06/08/2020    CREATININE 1.4 06/08/2020    GFRAA >60 06/08/2020    LABGLOM 50 06/08/2020    GLUCOSE 115 06/08/2020    PROT 5.9 06/08/2020    LABALBU 3.3 06/08/2020    CALCIUM 8.9 06/08/2020    BILITOT 4.6 06/08/2020    ALKPHOS 196 06/08/2020     06/08/2020     06/08/2020       Lab Results   Component Value Date    PROTIME 16.0 06/07/2020    INR 1.3 06/07/2020       Lab Results   Component Value Date    TSH 0.270 06/07/2020       Lab Results   Component Value Date    COLORU DARK YELLOW 06/07/2020    PHUR 5.5 06/07/2020    WBCUA 0-1 06/07/2020    RBCUA 1-3 06/07/2020    MUCUS Present 06/07/2020    BACTERIA MODERATE 06/07/2020 CLARITYU Clear 06/07/2020    SPECGRAV <=1.005 06/07/2020    LEUKOCYTESUR Negative 06/07/2020    UROBILINOGEN 0.2 06/07/2020    BILIRUBINUR SMALL 06/07/2020    BLOODU TRACE-LYSED 06/07/2020    GLUCOSEU Negative 06/07/2020    AMORPHOUS MODERATE 06/07/2020       No results found for: HCO3, BE, O2SAT, PH, THGB, PCO2, PO2, TCO2  Radiology:  Noted    Microbiology:  Pending  No results for input(s): BC in the last 72 hours. No results for input(s): ORG in the last 72 hours. No results for input(s): Eyvonne Micaela in the last 72 hours. No results for input(s): STREPNEUMAGU in the last 72 hours. No results for input(s): LP1UAG in the last 72 hours. No results for input(s): ASO in the last 72 hours. No results for input(s): CULTRESP in the last 72 hours. No results for input(s): PROCAL in the last 72 hours. Assessment:  · Probable gallstone pancreatitis  · Cholangitis/cholecystitis associated to the above  · Fever associated to the above  · Leukopenia probably secondary to sepsis, followed by leukocytosis    Plan:    · I think Zosyn is a good choice of antibiotics for this condition. We will continue Zosyn  · Check blood cultures, baseline ESR, CRP  · Monitor labs  · Will follow with you    Thank you for having us see this patient in consultation. I will be discussing this case with the treating physicians.     Vicente Dyer  12:50 PM  6/8/2020

## 2020-06-08 NOTE — ANESTHESIA PRE PROCEDURE
Department of Anesthesiology  Preprocedure Note       Name:  Shelba Hammans   Age:  69 y.o.  :  1945                                          MRN:  [id number]         Date:  2020      Surgeon: Jai Vasquez):  Michael Nunes MD    Procedure: Procedure(s):  ERCP DIAGNOSTIC    Medications prior to admission:   Prior to Admission medications    Medication Sig Start Date End Date Taking? Authorizing Provider   hydroxychloroquine (PLAQUENIL) 200 MG tablet TAKE 1 TABLET DAILY 10/28/17  Yes Historical Provider, MD   lisinopril (PRINIVIL;ZESTRIL) 40 MG tablet  16  Yes Historical Provider, MD   vitamin E 400 UNIT capsule Take 400 Units by mouth daily   Yes Historical Provider, MD   oxyCODONE 5 MG capsule Take 5 mg by mouth every 4 hours as needed for Pain.    Yes Historical Provider, MD   diclofenac (VOLTAREN) 75 MG EC tablet Take 75 mg by mouth daily   Yes Historical Provider, MD   omeprazole (PRILOSEC) 20 MG delayed release capsule Take 20 mg by mouth daily   Yes Historical Provider, MD   Omega-3 Fatty Acids (FISH OIL) 1000 MG CAPS Take 3,000 mg by mouth daily   Yes Historical Provider, MD   Psyllium-Calcium (METAMUCIL PLUS CALCIUM PO) Take 1 tablet by mouth 2 times daily   Yes Historical Provider, MD   calcium citrate (CALCITRATE) 250 MG TABS tablet Take 1,000 mg by mouth daily   Yes Historical Provider, MD   vitamin D (ERGOCALCIFEROL) 1.25 MG (70446 UT) CAPS capsule Take 5,000 Units by mouth daily   Yes Historical Provider, MD   magnesium (MAGNESIUM-OXIDE) 250 MG TABS tablet Take 250 mg by mouth daily   Yes Historical Provider, MD   Multiple Vitamins-Minerals (MULTIVITAMIN ADULT PO) Take 1 tablet by mouth daily   Yes Historical Provider, MD   sildenafil (VIAGRA) 50 MG tablet Take 50 mg by mouth as needed for Erectile Dysfunction    Historical Provider, MD       Current medications:    Current Facility-Administered Medications   Medication Dose Route Frequency Provider Last Rate Last Dose    pantoprazole (PROTONIX) injection 40 mg  40 mg Intravenous BID Joe Price MD   40 mg at 06/08/20 0600    And    sodium chloride (PF) 0.9 % injection 10 mL  10 mL Intravenous BID Joe Price MD        piperacillin-tazobactam (ZOSYN) 3.375 g in dextrose 5 % 50 mL IVPB extended infusion (mini-bag)  3.375 g Intravenous Q8H Aleah Running, APRN - CNS 12.5 mL/hr at 06/08/20 1034 3.375 g at 06/08/20 1034    And    0.9 % sodium chloride infusion   Intravenous Q8H Aleah Running, APRN - CNS 12.5 mL/hr at 06/08/20 1414      sodium chloride flush 0.9 % injection 10 mL  10 mL Intravenous 2 times per day Valerie Mejia MD   10 mL at 06/08/20 1045    sodium chloride flush 0.9 % injection 10 mL  10 mL Intravenous PRN Valerie Mejia MD        acetaminophen (TYLENOL) tablet 650 mg  650 mg Oral Q6H PRN Valerie Mejia MD        Or   [place] acetaminophen (TYLENOL) suppository 650 mg  650 mg Rectal Q6H PRN Valerie Mejia MD        magnesium hydroxide (MILK OF MAGNESIA) 400 MG/5ML suspension 30 mL  30 mL Oral Daily PRN Valerie Mejia MD        promethazine (PHENERGAN) tablet 12.5 mg  12.5 mg Oral Q6H PRN Valerie Mejia MD        Or    ondansetron (ZOFRAN) injection 4 mg  4 mg Intravenous Q6H PRN Valerie Mejia MD        lactated ringers infusion   Intravenous Continuous Valerie Mejia  mL/hr at 06/08/20 0744      heparin (porcine) injection 5,000 Units  5,000 Units Subcutaneous 3 times per day Valerie Mejia MD   5,000 Units at 06/08/20 1358    morphine (PF) injection 2 mg  2 mg Intravenous Q2H PRN Valerei Mejia MD   2 mg at 06/08/20 1044    Or    morphine sulfate (PF) injection 4 mg  4 mg Intravenous Q2H PRN Valerie Mejia MD   4 mg at 06/08/20 1352       Allergies:  No Known Allergies    Problem List:    Patient Active Problem List   Diagnosis Code    Acute pancreatitis K85.90    Acute pancreatitis without infection or necrosis K85.90    Hypertension I10    Arthritis M19.90       Past Medical History: Diagnosis Date    Arthritis     Hypertension        Past Surgical History:        Procedure Laterality Date    EYE SURGERY      FRACTURE SURGERY      JOINT REPLACEMENT      TONSILLECTOMY         Social History:    Social History     Tobacco Use    Smoking status: Former Smoker    Smokeless tobacco: Never Used   Substance Use Topics    Alcohol use: Not Currently                                Counseling given: Not Answered      Vital Signs (Current):   Vitals:    06/07/20 1730 06/07/20 1945 06/08/20 0035 06/08/20 0730   BP: (!) 95/56 (!) 103/59  113/76   Pulse: 102 79     Resp: 20 18     Temp: 36.8 °C (98.2 °F) 36.9 °C (98.4 °F)  36.7 °C (98.1 °F)   TempSrc: Oral Oral  Oral   SpO2: 92% 97%     Weight:   185 lb 11.2 oz (84.2 kg)    Height:                                                  BP Readings from Last 3 Encounters:   06/08/20 113/76       NPO Status:   pt instructed to be NPO after 2359 on 6/8/20                                                                             BMI:   Wt Readings from Last 3 Encounters:   06/08/20 185 lb 11.2 oz (84.2 kg)     Body mass index is 25.9 kg/m². CBC:   Lab Results   Component Value Date    WBC 19.2 06/08/2020    RBC 4.74 06/08/2020    HGB 13.5 06/08/2020    HCT 41.8 06/08/2020    MCV 88.2 06/08/2020    RDW 15.7 06/08/2020     06/08/2020       CMP:   Lab Results   Component Value Date     06/08/2020    K 4.3 06/08/2020     06/08/2020    CO2 21 06/08/2020    BUN 29 06/08/2020    CREATININE 1.4 06/08/2020    GFRAA >60 06/08/2020    LABGLOM 50 06/08/2020    GLUCOSE 115 06/08/2020    PROT 5.9 06/08/2020    CALCIUM 8.9 06/08/2020    BILITOT 4.6 06/08/2020    ALKPHOS 196 06/08/2020     06/08/2020     06/08/2020       POC Tests: No results for input(s): POCGLU, POCNA, POCK, POCCL, POCBUN, POCHEMO, POCHCT in the last 72 hours.     Coags:   Lab Results   Component Value Date    PROTIME 16.0 06/07/2020    INR 1.3 06/07/2020       HCG

## 2020-06-08 NOTE — PLAN OF CARE
Problem: Pain:  Goal: Pain level will decrease  Description: Pain level will decrease  Outcome: Met This Shift     Problem: Pain:  Goal: Control of chronic pain  Description: Control of chronic pain  Outcome: Met This Shift

## 2020-06-09 ENCOUNTER — APPOINTMENT (OUTPATIENT)
Dept: GENERAL RADIOLOGY | Age: 75
DRG: 871 | End: 2020-06-09
Payer: MEDICARE

## 2020-06-09 ENCOUNTER — ANESTHESIA (OUTPATIENT)
Dept: ENDOSCOPY | Age: 75
DRG: 871 | End: 2020-06-09
Payer: MEDICARE

## 2020-06-09 VITALS — OXYGEN SATURATION: 96 % | DIASTOLIC BLOOD PRESSURE: 92 MMHG | SYSTOLIC BLOOD PRESSURE: 154 MMHG

## 2020-06-09 LAB
ACINETOBACTER BAUMANNII BY PCR: NOT DETECTED
AFP-TUMOR MARKER: 2 NG/ML (ref 0–9)
ALBUMIN SERPL-MCNC: 2.9 G/DL (ref 3.5–5.2)
ALP BLD-CCNC: 208 U/L (ref 40–129)
ALPHA-1 ANTITRYPSIN: 202 MG/DL (ref 90–200)
ALT SERPL-CCNC: 109 U/L (ref 0–40)
AMYLASE: 109 U/L (ref 20–100)
ANION GAP SERPL CALCULATED.3IONS-SCNC: 10 MMOL/L (ref 7–16)
APTT: 31.2 SEC (ref 24.5–35.1)
AST SERPL-CCNC: 67 U/L (ref 0–39)
BASOPHILS ABSOLUTE: 0.02 E9/L (ref 0–0.2)
BASOPHILS RELATIVE PERCENT: 0.1 % (ref 0–2)
BILIRUB SERPL-MCNC: 2.3 MG/DL (ref 0–1.2)
BOTTLE TYPE: ABNORMAL
BUN BLDV-MCNC: 25 MG/DL (ref 8–23)
BURR CELLS: ABNORMAL
C-REACTIVE PROTEIN: 28.6 MG/DL (ref 0–0.4)
CA 19-9: 44 U/ML (ref 0–37)
CALCIUM SERPL-MCNC: 8.8 MG/DL (ref 8.6–10.2)
CANDIDA ALBICANS BY PCR: NOT DETECTED
CANDIDA GLABRATA BY PCR: NOT DETECTED
CANDIDA KRUSEI BY PCR: NOT DETECTED
CANDIDA PARAPSILOSIS BY PCR: NOT DETECTED
CANDIDA TROPICALIS BY PCR: NOT DETECTED
CARBAPENEM RESISTANCE KPC BY PCR: NOT DETECTED
CHLORIDE BLD-SCNC: 106 MMOL/L (ref 98–107)
CO2: 22 MMOL/L (ref 22–29)
CREAT SERPL-MCNC: 1 MG/DL (ref 0.7–1.2)
EKG ATRIAL RATE: 100 BPM
EKG P AXIS: 40 DEGREES
EKG P-R INTERVAL: 128 MS
EKG Q-T INTERVAL: 344 MS
EKG QRS DURATION: 102 MS
EKG QTC CALCULATION (BAZETT): 443 MS
EKG R AXIS: 62 DEGREES
EKG T AXIS: 17 DEGREES
EKG VENTRICULAR RATE: 100 BPM
ENTEROBACTER CLOACAE COMPLEX BY PCR: NOT DETECTED
ENTEROBACTERALES BY PCR: DETECTED
ENTEROCOCCUS BY PCR: NOT DETECTED
EOSINOPHILS ABSOLUTE: 0.01 E9/L (ref 0.05–0.5)
EOSINOPHILS RELATIVE PERCENT: 0.1 % (ref 0–6)
ESCHERICHIA COLI BY PCR: DETECTED
GFR AFRICAN AMERICAN: >60
GFR NON-AFRICAN AMERICAN: >60 ML/MIN/1.73
GLUCOSE BLD-MCNC: 114 MG/DL (ref 74–99)
HAEMOPHILUS INFLUENZAE BY PCR: NOT DETECTED
HCT VFR BLD CALC: 39.4 % (ref 37–54)
HEMOGLOBIN: 12.6 G/DL (ref 12.5–16.5)
IMMATURE GRANULOCYTES #: 0.56 E9/L
IMMATURE GRANULOCYTES %: 3.4 % (ref 0–5)
INR BLD: 1.2
KLEBSIELLA OXYTOCA BY PCR: NOT DETECTED
KLEBSIELLA PNEUMONIAE GROUP BY PCR: NOT DETECTED
LIPASE: 90 U/L (ref 13–60)
LISTERIA MONOCYTOGENES BY PCR: NOT DETECTED
LYMPHOCYTES ABSOLUTE: 0.61 E9/L (ref 1.5–4)
LYMPHOCYTES RELATIVE PERCENT: 3.7 % (ref 20–42)
MCH RBC QN AUTO: 28.4 PG (ref 26–35)
MCHC RBC AUTO-ENTMCNC: 32 % (ref 32–34.5)
MCV RBC AUTO: 88.7 FL (ref 80–99.9)
MONOCYTES ABSOLUTE: 1.28 E9/L (ref 0.1–0.95)
MONOCYTES RELATIVE PERCENT: 7.7 % (ref 2–12)
NEISSERIA MENINGITIDIS BY PCR: NOT DETECTED
NEUTROPHILS ABSOLUTE: 14.16 E9/L (ref 1.8–7.3)
NEUTROPHILS RELATIVE PERCENT: 85 % (ref 43–80)
ORDER NUMBER: ABNORMAL
OVALOCYTES: ABNORMAL
PDW BLD-RTO: 15.9 FL (ref 11.5–15)
PLATELET # BLD: 107 E9/L (ref 130–450)
PMV BLD AUTO: 11.8 FL (ref 7–12)
POIKILOCYTES: ABNORMAL
POTASSIUM SERPL-SCNC: 4.2 MMOL/L (ref 3.5–5)
PROTEUS BY PCR: NOT DETECTED
PROTHROMBIN TIME: 14.5 SEC (ref 9.3–12.4)
PSEUDOMONAS AERUGINOSA BY PCR: NOT DETECTED
RBC # BLD: 4.44 E12/L (ref 3.8–5.8)
REASON FOR REJECTION: NORMAL
REJECTED TEST: NORMAL
SEDIMENTATION RATE, ERYTHROCYTE: 39 MM/HR (ref 0–15)
SERRATIA MARCESCENS BY PCR: NOT DETECTED
SODIUM BLD-SCNC: 138 MMOL/L (ref 132–146)
SOURCE OF BLOOD CULTURE: ABNORMAL
STAPHYLOCOCCUS AUREUS BY PCR: NOT DETECTED
STAPHYLOCOCCUS SPECIES BY PCR: NOT DETECTED
STREPTOCOCCUS AGALACTIAE BY PCR: NOT DETECTED
STREPTOCOCCUS PNEUMONIAE BY PCR: NOT DETECTED
STREPTOCOCCUS PYOGENES  BY PCR: NOT DETECTED
STREPTOCOCCUS SPECIES BY PCR: NOT DETECTED
TOTAL PROTEIN: 5.8 G/DL (ref 6.4–8.3)
WBC # BLD: 16.6 E9/L (ref 4.5–11.5)

## 2020-06-09 PROCEDURE — 2500000003 HC RX 250 WO HCPCS: Performed by: ANESTHESIOLOGY

## 2020-06-09 PROCEDURE — 6370000000 HC RX 637 (ALT 250 FOR IP): Performed by: INTERNAL MEDICINE

## 2020-06-09 PROCEDURE — C9113 INJ PANTOPRAZOLE SODIUM, VIA: HCPCS | Performed by: INTERNAL MEDICINE

## 2020-06-09 PROCEDURE — 6360000002 HC RX W HCPCS: Performed by: INTERNAL MEDICINE

## 2020-06-09 PROCEDURE — 2580000003 HC RX 258: Performed by: NURSE ANESTHETIST, CERTIFIED REGISTERED

## 2020-06-09 PROCEDURE — 6370000000 HC RX 637 (ALT 250 FOR IP): Performed by: CLINICAL NURSE SPECIALIST

## 2020-06-09 PROCEDURE — 6360000002 HC RX W HCPCS: Performed by: STUDENT IN AN ORGANIZED HEALTH CARE EDUCATION/TRAINING PROGRAM

## 2020-06-09 PROCEDURE — 80053 COMPREHEN METABOLIC PANEL: CPT

## 2020-06-09 PROCEDURE — 2709999900 HC NON-CHARGEABLE SUPPLY: Performed by: INTERNAL MEDICINE

## 2020-06-09 PROCEDURE — C1769 GUIDE WIRE: HCPCS | Performed by: INTERNAL MEDICINE

## 2020-06-09 PROCEDURE — 6360000002 HC RX W HCPCS: Performed by: NURSE ANESTHETIST, CERTIFIED REGISTERED

## 2020-06-09 PROCEDURE — 93005 ELECTROCARDIOGRAM TRACING: CPT | Performed by: ANESTHESIOLOGY

## 2020-06-09 PROCEDURE — 0FC98ZZ EXTIRPATION OF MATTER FROM COMMON BILE DUCT, VIA NATURAL OR ARTIFICIAL OPENING ENDOSCOPIC: ICD-10-PCS | Performed by: INTERNAL MEDICINE

## 2020-06-09 PROCEDURE — 2580000003 HC RX 258: Performed by: STUDENT IN AN ORGANIZED HEALTH CARE EDUCATION/TRAINING PROGRAM

## 2020-06-09 PROCEDURE — 3700000001 HC ADD 15 MINUTES (ANESTHESIA): Performed by: INTERNAL MEDICINE

## 2020-06-09 PROCEDURE — 0F798ZZ DILATION OF COMMON BILE DUCT, VIA NATURAL OR ARTIFICIAL OPENING ENDOSCOPIC: ICD-10-PCS | Performed by: INTERNAL MEDICINE

## 2020-06-09 PROCEDURE — 74330 X-RAY BILE/PANC ENDOSCOPY: CPT

## 2020-06-09 PROCEDURE — 85610 PROTHROMBIN TIME: CPT

## 2020-06-09 PROCEDURE — 82150 ASSAY OF AMYLASE: CPT

## 2020-06-09 PROCEDURE — 7100000011 HC PHASE II RECOVERY - ADDTL 15 MIN: Performed by: INTERNAL MEDICINE

## 2020-06-09 PROCEDURE — 3609015200 HC ERCP REMOVE CALCULI/DEBRIS BILIARY/PANCREAS DUCT: Performed by: INTERNAL MEDICINE

## 2020-06-09 PROCEDURE — 85730 THROMBOPLASTIN TIME PARTIAL: CPT

## 2020-06-09 PROCEDURE — 2580000003 HC RX 258: Performed by: INTERNAL MEDICINE

## 2020-06-09 PROCEDURE — 6360000004 HC RX CONTRAST MEDICATION: Performed by: INTERNAL MEDICINE

## 2020-06-09 PROCEDURE — 2500000003 HC RX 250 WO HCPCS

## 2020-06-09 PROCEDURE — 2580000003 HC RX 258: Performed by: CLINICAL NURSE SPECIALIST

## 2020-06-09 PROCEDURE — 87040 BLOOD CULTURE FOR BACTERIA: CPT

## 2020-06-09 PROCEDURE — 2060000000 HC ICU INTERMEDIATE R&B

## 2020-06-09 PROCEDURE — 2720000010 HC SURG SUPPLY STERILE: Performed by: INTERNAL MEDICINE

## 2020-06-09 PROCEDURE — 2500000003 HC RX 250 WO HCPCS: Performed by: NURSE ANESTHETIST, CERTIFIED REGISTERED

## 2020-06-09 PROCEDURE — 85651 RBC SED RATE NONAUTOMATED: CPT

## 2020-06-09 PROCEDURE — 36415 COLL VENOUS BLD VENIPUNCTURE: CPT

## 2020-06-09 PROCEDURE — 6360000002 HC RX W HCPCS: Performed by: CLINICAL NURSE SPECIALIST

## 2020-06-09 PROCEDURE — 7100000010 HC PHASE II RECOVERY - FIRST 15 MIN: Performed by: INTERNAL MEDICINE

## 2020-06-09 PROCEDURE — 93010 ELECTROCARDIOGRAM REPORT: CPT | Performed by: INTERNAL MEDICINE

## 2020-06-09 PROCEDURE — 86140 C-REACTIVE PROTEIN: CPT

## 2020-06-09 PROCEDURE — 83690 ASSAY OF LIPASE: CPT

## 2020-06-09 PROCEDURE — 3700000000 HC ANESTHESIA ATTENDED CARE: Performed by: INTERNAL MEDICINE

## 2020-06-09 PROCEDURE — 6360000002 HC RX W HCPCS

## 2020-06-09 PROCEDURE — C1874 STENT, COATED/COV W/DEL SYS: HCPCS | Performed by: INTERNAL MEDICINE

## 2020-06-09 PROCEDURE — 85025 COMPLETE CBC W/AUTO DIFF WBC: CPT

## 2020-06-09 DEVICE — STENT SYSTEM RMV
Type: IMPLANTABLE DEVICE | Site: BILE DUCT | Status: NON-FUNCTIONAL
Brand: WALLFLEX BILIARY
Removed: 2020-08-03

## 2020-06-09 RX ORDER — METOPROLOL TARTRATE 5 MG/5ML
INJECTION INTRAVENOUS
Status: COMPLETED
Start: 2020-06-09 | End: 2020-06-09

## 2020-06-09 RX ORDER — SODIUM CHLORIDE 9 MG/ML
INJECTION, SOLUTION INTRAVENOUS CONTINUOUS PRN
Status: DISCONTINUED | OUTPATIENT
Start: 2020-06-09 | End: 2020-06-09 | Stop reason: SDUPTHER

## 2020-06-09 RX ORDER — ONDANSETRON 2 MG/ML
INJECTION INTRAMUSCULAR; INTRAVENOUS PRN
Status: DISCONTINUED | OUTPATIENT
Start: 2020-06-09 | End: 2020-06-09 | Stop reason: SDUPTHER

## 2020-06-09 RX ORDER — LABETALOL HYDROCHLORIDE 5 MG/ML
10 INJECTION, SOLUTION INTRAVENOUS ONCE
Status: COMPLETED | OUTPATIENT
Start: 2020-06-09 | End: 2020-06-09

## 2020-06-09 RX ORDER — METOPROLOL TARTRATE 5 MG/5ML
5 INJECTION INTRAVENOUS
Status: COMPLETED | OUTPATIENT
Start: 2020-06-09 | End: 2020-06-09

## 2020-06-09 RX ORDER — PROPOFOL 10 MG/ML
INJECTION, EMULSION INTRAVENOUS CONTINUOUS PRN
Status: DISCONTINUED | OUTPATIENT
Start: 2020-06-09 | End: 2020-06-09 | Stop reason: SDUPTHER

## 2020-06-09 RX ADMIN — GLUCAGON HYDROCHLORIDE 0.25 MG: KIT at 14:39

## 2020-06-09 RX ADMIN — HEPARIN SODIUM 5000 UNITS: 10000 INJECTION INTRAVENOUS; SUBCUTANEOUS at 21:48

## 2020-06-09 RX ADMIN — INDOMETHACIN 100 MG: 50 SUPPOSITORY RECTAL at 10:55

## 2020-06-09 RX ADMIN — PIPERACILLIN AND TAZOBACTAM 3.38 G: 3; .375 INJECTION, POWDER, FOR SOLUTION INTRAVENOUS at 10:13

## 2020-06-09 RX ADMIN — SODIUM CHLORIDE: 9 INJECTION, SOLUTION INTRAVENOUS at 14:16

## 2020-06-09 RX ADMIN — METOPROLOL TARTRATE 5 MG: 5 INJECTION INTRAVENOUS at 16:00

## 2020-06-09 RX ADMIN — SODIUM CHLORIDE: 9 INJECTION, SOLUTION INTRAVENOUS at 05:30

## 2020-06-09 RX ADMIN — ONDANSETRON HYDROCHLORIDE 4 MG: 2 INJECTION, SOLUTION INTRAMUSCULAR; INTRAVENOUS at 14:32

## 2020-06-09 RX ADMIN — MORPHINE SULFATE 2 MG: 2 INJECTION, SOLUTION INTRAMUSCULAR; INTRAVENOUS at 10:12

## 2020-06-09 RX ADMIN — SODIUM CHLORIDE, POTASSIUM CHLORIDE, SODIUM LACTATE AND CALCIUM CHLORIDE 1000 ML: 600; 310; 30; 20 INJECTION, SOLUTION INTRAVENOUS at 10:12

## 2020-06-09 RX ADMIN — SODIUM CHLORIDE, PRESERVATIVE FREE 10 ML: 5 INJECTION INTRAVENOUS at 10:15

## 2020-06-09 RX ADMIN — PIPERACILLIN AND TAZOBACTAM 3.38 G: 3; .375 INJECTION, POWDER, FOR SOLUTION INTRAVENOUS at 01:45

## 2020-06-09 RX ADMIN — PANTOPRAZOLE SODIUM 40 MG: 40 INJECTION, POWDER, FOR SOLUTION INTRAVENOUS at 17:08

## 2020-06-09 RX ADMIN — PROPOFOL 100 MCG/KG/MIN: 10 INJECTION, EMULSION INTRAVENOUS at 14:31

## 2020-06-09 RX ADMIN — METOPROLOL TARTRATE 25 MG: 25 TABLET, FILM COATED ORAL at 21:30

## 2020-06-09 RX ADMIN — MORPHINE SULFATE 4 MG: 4 INJECTION, SOLUTION INTRAMUSCULAR; INTRAVENOUS at 03:52

## 2020-06-09 RX ADMIN — SODIUM CHLORIDE: 9 INJECTION, SOLUTION INTRAVENOUS at 21:52

## 2020-06-09 RX ADMIN — SODIUM CHLORIDE, PRESERVATIVE FREE 10 ML: 5 INJECTION INTRAVENOUS at 21:30

## 2020-06-09 RX ADMIN — IOPAMIDOL 15 ML: 612 INJECTION, SOLUTION INTRAVENOUS at 14:54

## 2020-06-09 RX ADMIN — PIPERACILLIN AND TAZOBACTAM 3.38 G: 3; .375 INJECTION, POWDER, FOR SOLUTION INTRAVENOUS at 17:09

## 2020-06-09 RX ADMIN — LABETALOL HYDROCHLORIDE 10 MG: 5 INJECTION INTRAVENOUS at 15:28

## 2020-06-09 RX ADMIN — METOPROLOL TARTRATE 5 MG: 5 INJECTION INTRAVENOUS at 17:09

## 2020-06-09 RX ADMIN — GLUCAGON HYDROCHLORIDE 0.25 MG: KIT at 14:45

## 2020-06-09 RX ADMIN — SODIUM CHLORIDE, POTASSIUM CHLORIDE, SODIUM LACTATE AND CALCIUM CHLORIDE: 600; 310; 30; 20 INJECTION, SOLUTION INTRAVENOUS at 21:56

## 2020-06-09 RX ADMIN — SODIUM CHLORIDE, PRESERVATIVE FREE 10 ML: 5 INJECTION INTRAVENOUS at 10:14

## 2020-06-09 RX ADMIN — PANTOPRAZOLE SODIUM 40 MG: 40 INJECTION, POWDER, FOR SOLUTION INTRAVENOUS at 06:29

## 2020-06-09 ASSESSMENT — PAIN SCALES - GENERAL
PAINLEVEL_OUTOF10: 2
PAINLEVEL_OUTOF10: 0
PAINLEVEL_OUTOF10: 2
PAINLEVEL_OUTOF10: 0
PAINLEVEL_OUTOF10: 0
PAINLEVEL_OUTOF10: 5
PAINLEVEL_OUTOF10: 4
PAINLEVEL_OUTOF10: 0
PAINLEVEL_OUTOF10: 0
PAINLEVEL_OUTOF10: 2
PAINLEVEL_OUTOF10: 0

## 2020-06-09 NOTE — PROGRESS NOTES
Report called to floor. Dr Maria Del Rosario Terrazas called and updated to new onset At [facility] RVR   No distress. . Ready to transfer to floor.

## 2020-06-09 NOTE — BRIEF OP NOTE
Brief Postoperative Note    Bettyann Fothergill  YOB: 1945  [id number]    Procedure:  ERCP    Anesthesia: [facility]      Surgeon:  Miriam Olvera MD      Findings: CBD: DILATED WITH FILLING DEFECT C/W STONE.  DUODENAL DIVERTICULUM PAPILLOTOMY WAS DONE , STONE REMOVED VIA BALLOON WITH LARGE AMOUNT OF PUS DRAINED. 10X60 FULLY COVERED WALL STENT WAS PLACED WITH EXCELLENT DRAINAGE                      PD: NOT ENTERED                          Complications: None      Estimated blood loss: none        Ji Vázquez MD

## 2020-06-09 NOTE — PROGRESS NOTES
have an ERCP today. We will go ahead and repeat one set of blood cultures today.     Merly Tran  6/9/2020  12:34 PM

## 2020-06-09 NOTE — PROGRESS NOTES
Notified [id number] [facility] cardiology of new consult ordered by Chu Scmhidt for new onset Afib RVR

## 2020-06-09 NOTE — CARE COORDINATION
SOCIAL WORK / DISCHARGE PLANNING:  Sw attempted to see pt, off unit for ERCP. ID following on IV Zosyn. Dc plan to return home with spouse.                  Electronically signed by KEELY Badillo on 6/9/2020 at 3:50 PM

## 2020-06-10 LAB
ALBUMIN SERPL-MCNC: 2.5 G/DL (ref 3.5–5.2)
ALP BLD-CCNC: 180 U/L (ref 40–129)
ALT SERPL-CCNC: 60 U/L (ref 0–40)
AMYLASE: 32 U/L (ref 20–100)
ANION GAP SERPL CALCULATED.3IONS-SCNC: 8 MMOL/L (ref 7–16)
ANTISMOOTH MUSCLE AB, QUANT: NORMAL
AST SERPL-CCNC: 30 U/L (ref 0–39)
BASOPHILS ABSOLUTE: 0.01 E9/L (ref 0–0.2)
BASOPHILS RELATIVE PERCENT: 0.1 % (ref 0–2)
BILIRUB SERPL-MCNC: 1.7 MG/DL (ref 0–1.2)
BILIRUBIN DIRECT: 1.1 MG/DL (ref 0–0.3)
BILIRUBIN, INDIRECT: 0.6 MG/DL (ref 0–1)
BUN BLDV-MCNC: 25 MG/DL (ref 8–23)
BURR CELLS: ABNORMAL
CALCIUM SERPL-MCNC: 8.7 MG/DL (ref 8.6–10.2)
CHLORIDE BLD-SCNC: 105 MMOL/L (ref 98–107)
CO2: 23 MMOL/L (ref 22–29)
CREAT SERPL-MCNC: 1 MG/DL (ref 0.7–1.2)
DOHLE BODIES: ABNORMAL
EKG ATRIAL RATE: 131 BPM
EKG Q-T INTERVAL: 324 MS
EKG QRS DURATION: 98 MS
EKG QTC CALCULATION (BAZETT): 486 MS
EKG R AXIS: 39 DEGREES
EKG T AXIS: -19 DEGREES
EKG VENTRICULAR RATE: 135 BPM
EOSINOPHILS ABSOLUTE: 0.13 E9/L (ref 0.05–0.5)
EOSINOPHILS RELATIVE PERCENT: 0.9 % (ref 0–6)
GFR AFRICAN AMERICAN: >60
GFR NON-AFRICAN AMERICAN: >60 ML/MIN/1.73
GLUCOSE BLD-MCNC: 77 MG/DL (ref 74–99)
HCT VFR BLD CALC: 39.5 % (ref 37–54)
HEMOGLOBIN: 12.7 G/DL (ref 12.5–16.5)
IGG 1: 275 MG/DL (ref 240–1118)
IGG 2: 256 MG/DL (ref 124–549)
IGG 3: 67 MG/DL (ref 21–134)
IGG 4: 6 MG/DL (ref 1–123)
IMMATURE GRANULOCYTES #: 0.06 E9/L
IMMATURE GRANULOCYTES %: 0.4 % (ref 0–5)
LIPASE: 35 U/L (ref 13–60)
LV EF: 55 %
LVEF MODALITY: NORMAL
LYMPHOCYTES ABSOLUTE: 0.72 E9/L (ref 1.5–4)
LYMPHOCYTES RELATIVE PERCENT: 4.8 % (ref 20–42)
MAGNESIUM: 2.2 MG/DL (ref 1.6–2.6)
MCH RBC QN AUTO: 28.1 PG (ref 26–35)
MCHC RBC AUTO-ENTMCNC: 32.2 % (ref 32–34.5)
MCV RBC AUTO: 87.4 FL (ref 80–99.9)
MONOCYTES ABSOLUTE: 1.35 E9/L (ref 0.1–0.95)
MONOCYTES RELATIVE PERCENT: 9 % (ref 2–12)
NEUTROPHILS ABSOLUTE: 12.66 E9/L (ref 1.8–7.3)
NEUTROPHILS RELATIVE PERCENT: 84.8 % (ref 43–80)
OVALOCYTES: ABNORMAL
PDW BLD-RTO: 15.9 FL (ref 11.5–15)
PLATELET # BLD: 114 E9/L (ref 130–450)
PMV BLD AUTO: 12.2 FL (ref 7–12)
POIKILOCYTES: ABNORMAL
POTASSIUM SERPL-SCNC: 3.6 MMOL/L (ref 3.5–5)
RBC # BLD: 4.52 E12/L (ref 3.8–5.8)
SMOOTH MUSCLE ANTIBODY: POSITIVE
SODIUM BLD-SCNC: 136 MMOL/L (ref 132–146)
TOTAL PROTEIN: 5.6 G/DL (ref 6.4–8.3)
WBC # BLD: 14.9 E9/L (ref 4.5–11.5)

## 2020-06-10 PROCEDURE — 6370000000 HC RX 637 (ALT 250 FOR IP): Performed by: INTERNAL MEDICINE

## 2020-06-10 PROCEDURE — 2580000003 HC RX 258: Performed by: INTERNAL MEDICINE

## 2020-06-10 PROCEDURE — 6370000000 HC RX 637 (ALT 250 FOR IP): Performed by: NURSE PRACTITIONER

## 2020-06-10 PROCEDURE — 2500000003 HC RX 250 WO HCPCS: Performed by: INTERNAL MEDICINE

## 2020-06-10 PROCEDURE — 86301 IMMUNOASSAY TUMOR CA 19-9: CPT

## 2020-06-10 PROCEDURE — 82150 ASSAY OF AMYLASE: CPT

## 2020-06-10 PROCEDURE — 36415 COLL VENOUS BLD VENIPUNCTURE: CPT

## 2020-06-10 PROCEDURE — 2060000000 HC ICU INTERMEDIATE R&B

## 2020-06-10 PROCEDURE — 93306 TTE W/DOPPLER COMPLETE: CPT

## 2020-06-10 PROCEDURE — 6360000002 HC RX W HCPCS: Performed by: SPECIALIST

## 2020-06-10 PROCEDURE — 99223 1ST HOSP IP/OBS HIGH 75: CPT | Performed by: INTERNAL MEDICINE

## 2020-06-10 PROCEDURE — 83735 ASSAY OF MAGNESIUM: CPT

## 2020-06-10 PROCEDURE — 6360000002 HC RX W HCPCS: Performed by: INTERNAL MEDICINE

## 2020-06-10 PROCEDURE — 2580000003 HC RX 258: Performed by: SPECIALIST

## 2020-06-10 PROCEDURE — 82248 BILIRUBIN DIRECT: CPT

## 2020-06-10 PROCEDURE — 85025 COMPLETE CBC W/AUTO DIFF WBC: CPT

## 2020-06-10 PROCEDURE — 99232 SBSQ HOSP IP/OBS MODERATE 35: CPT | Performed by: INTERNAL MEDICINE

## 2020-06-10 PROCEDURE — 93010 ELECTROCARDIOGRAM REPORT: CPT | Performed by: INTERNAL MEDICINE

## 2020-06-10 PROCEDURE — 83690 ASSAY OF LIPASE: CPT

## 2020-06-10 PROCEDURE — 80053 COMPREHEN METABOLIC PANEL: CPT

## 2020-06-10 PROCEDURE — C9113 INJ PANTOPRAZOLE SODIUM, VIA: HCPCS | Performed by: INTERNAL MEDICINE

## 2020-06-10 PROCEDURE — APPSS60 APP SPLIT SHARED TIME 46-60 MINUTES: Performed by: NURSE PRACTITIONER

## 2020-06-10 RX ORDER — DILTIAZEM HYDROCHLORIDE 5 MG/ML
25 INJECTION INTRAVENOUS PRN
Status: DISCONTINUED | OUTPATIENT
Start: 2020-06-10 | End: 2020-06-11 | Stop reason: HOSPADM

## 2020-06-10 RX ORDER — POTASSIUM CHLORIDE 20 MEQ/1
40 TABLET, EXTENDED RELEASE ORAL ONCE
Status: COMPLETED | OUTPATIENT
Start: 2020-06-10 | End: 2020-06-10

## 2020-06-10 RX ADMIN — PROMETHAZINE HYDROCHLORIDE 12.5 MG: 25 TABLET ORAL at 00:53

## 2020-06-10 RX ADMIN — PIPERACILLIN AND TAZOBACTAM 3.38 G: 3; .375 INJECTION, POWDER, FOR SOLUTION INTRAVENOUS at 09:04

## 2020-06-10 RX ADMIN — PIPERACILLIN AND TAZOBACTAM 3.38 G: 3; .375 INJECTION, POWDER, FOR SOLUTION INTRAVENOUS at 00:47

## 2020-06-10 RX ADMIN — MORPHINE SULFATE 2 MG: 2 INJECTION, SOLUTION INTRAMUSCULAR; INTRAVENOUS at 23:32

## 2020-06-10 RX ADMIN — DILTIAZEM HYDROCHLORIDE 25 MG: 5 INJECTION INTRAVENOUS at 01:30

## 2020-06-10 RX ADMIN — SODIUM CHLORIDE: 9 INJECTION, SOLUTION INTRAVENOUS at 06:07

## 2020-06-10 RX ADMIN — METOPROLOL TARTRATE 25 MG: 25 TABLET, FILM COATED ORAL at 21:30

## 2020-06-10 RX ADMIN — HEPARIN SODIUM 5000 UNITS: 10000 INJECTION INTRAVENOUS; SUBCUTANEOUS at 21:30

## 2020-06-10 RX ADMIN — SODIUM CHLORIDE, PRESERVATIVE FREE 10 ML: 5 INJECTION INTRAVENOUS at 09:04

## 2020-06-10 RX ADMIN — HEPARIN SODIUM 5000 UNITS: 10000 INJECTION INTRAVENOUS; SUBCUTANEOUS at 06:07

## 2020-06-10 RX ADMIN — PANTOPRAZOLE SODIUM 40 MG: 40 INJECTION, POWDER, FOR SOLUTION INTRAVENOUS at 18:19

## 2020-06-10 RX ADMIN — METOPROLOL TARTRATE 25 MG: 25 TABLET, FILM COATED ORAL at 09:04

## 2020-06-10 RX ADMIN — PANTOPRAZOLE SODIUM 40 MG: 40 INJECTION, POWDER, FOR SOLUTION INTRAVENOUS at 06:07

## 2020-06-10 RX ADMIN — SODIUM CHLORIDE, POTASSIUM CHLORIDE, SODIUM LACTATE AND CALCIUM CHLORIDE: 600; 310; 30; 20 INJECTION, SOLUTION INTRAVENOUS at 13:30

## 2020-06-10 RX ADMIN — HEPARIN SODIUM 5000 UNITS: 10000 INJECTION INTRAVENOUS; SUBCUTANEOUS at 14:15

## 2020-06-10 RX ADMIN — CEFTRIAXONE 2 G: 2 INJECTION, POWDER, FOR SOLUTION INTRAMUSCULAR; INTRAVENOUS at 14:15

## 2020-06-10 RX ADMIN — SODIUM CHLORIDE, POTASSIUM CHLORIDE, SODIUM LACTATE AND CALCIUM CHLORIDE: 600; 310; 30; 20 INJECTION, SOLUTION INTRAVENOUS at 06:08

## 2020-06-10 RX ADMIN — SODIUM CHLORIDE, PRESERVATIVE FREE 10 ML: 5 INJECTION INTRAVENOUS at 18:19

## 2020-06-10 RX ADMIN — SODIUM CHLORIDE, PRESERVATIVE FREE 10 ML: 5 INJECTION INTRAVENOUS at 21:30

## 2020-06-10 RX ADMIN — POTASSIUM CHLORIDE 40 MEQ: 20 TABLET, EXTENDED RELEASE ORAL at 09:04

## 2020-06-10 ASSESSMENT — PAIN DESCRIPTION - ORIENTATION: ORIENTATION: MID

## 2020-06-10 ASSESSMENT — PAIN DESCRIPTION - DESCRIPTORS: DESCRIPTORS: ACHING;DISCOMFORT

## 2020-06-10 ASSESSMENT — PAIN SCALES - GENERAL
PAINLEVEL_OUTOF10: 6
PAINLEVEL_OUTOF10: 0

## 2020-06-10 ASSESSMENT — PAIN DESCRIPTION - LOCATION: LOCATION: ABDOMEN

## 2020-06-10 ASSESSMENT — PAIN DESCRIPTION - PAIN TYPE: TYPE: ACUTE PAIN

## 2020-06-10 ASSESSMENT — PAIN DESCRIPTION - FREQUENCY: FREQUENCY: INTERMITTENT

## 2020-06-10 ASSESSMENT — PAIN DESCRIPTION - PROGRESSION: CLINICAL_PROGRESSION: GRADUALLY WORSENING

## 2020-06-10 ASSESSMENT — PAIN - FUNCTIONAL ASSESSMENT: PAIN_FUNCTIONAL_ASSESSMENT: PREVENTS OR INTERFERES SOME ACTIVE ACTIVITIES AND ADLS

## 2020-06-10 NOTE — OP NOTE
introduce a Wallstent, which was exchanged with the balloon  over a guidewire and placed in adequate position. The scope was then  retrieved and area decompressed and procedure was terminated. The  patient tolerated the procedure well.         Renetta Mccraty MD    D: 06/09/2020 15:20:16       T: 06/09/2020 15:26:41     [email or web address]  Job#: [id number]     Doc#: [id number]    CC:  Juan Manuel Delaney MD

## 2020-06-10 NOTE — CONSULTS
Inpatient Cardiology Consultation      Reason for Consult:  Afib with RVR    Consulting Physician: Dr. Christopher Lawler    Requesting Physician:  Dr. Servando Lee    Date of Consultation: 6/10/2020    HISTORY OF PRESENT ILLNESS:   Mr. Erum Aguero is a 69year old male who is not known to [facility]. PMH: BPH, hypertension, OA. [facility]-ED on 6/7/2020 with complaints of everton-umbilical abdominal \" cramping,\" bloating and low-grade fever. Patient stated that his abdominal discomfort was worsening when rolling \"side to side. \"  Denies chest pain, shortness of breath, nausea, vomiting, palpitations or feeling of his heart racing. Patient stated that before his abdominal pain began, he had been very active of the past few months of running around with his grandchildren denies any exertional chest pain or shortness of breath. Upon arrival to the ED: BP 99/65, , Afebrile, RR 20, RA. Labs: Na+ 138, k+ 4.1, BUN/Cr 26/1.5, Troponin <0.01, ALk phos 214, ,  (no improving), Lipase 2004. Positive opiates. WBC 3.4, H/H stable. US Gallbladder: moderate distention of the gallbladder with out evidence of cholecystitis. CT Abdomen/Pelvis: acute pancreatitis, Severe diverticulosis involving the sigmoid colon (See below for full report). EKG: Sinus rhythm, rate 100 bpm, QT/QTc 344/443 ms. Patient underwent  ERCP with papillotomy, stone extraction, and stent  Placement (6/9/2020). Patient was noted to have a brief run of Afib/flutter with RVR on 6/9/2020 at [phone] with spontaneous conversion to sinus rhythm. Patient had a second episode of Afib/flutter with RVR 06/10/2020 at approximately 0400 and spontaneously converted to sinus rhythm. She was started on Lopressor 25 twice daily and an echocardiogram was ordered. [id number] score at least 2 (HTN, Age).   Patient did report having an episode of \"fluttering and flip-flopping in my chest\" that occurred at approximately 4 AM on 6/10/2020 at the same time that age    REVIEW OF SYSTEMS:     · Constitutional: + fatigue. Denies fevers, chills or night sweats  · Eyes: Denies visual changes or drainage  · ENT: Denies headaches or hearing loss. No mouth sores or sore throat. No epistaxis   · Cardiovascular: Denies chest pain, pressure or palpitations. No lower extremity swelling. · Respiratory: Denies MARQUIS, cough, orthopnea or PND. No hemoptysis   · Gastrointestinal: See HPI. denies hematemesis or anorexia. No hematochezia or melena    · Genitourinary: Denies urgency, dysuria or hematuria. · Musculoskeletal: Denies gait disturbance, weakness or joint complaints  · Integumentary: Denies rash, hives or pruritis   · Neurological: Denies dizziness, headaches or seizures. No numbness or tingling  · Psychiatric: Denies anxiety or depression. · Endocrine: Denies temperature intolerance. No recent weight change. .  · Hematologic/Lymphatic: Denies abnormal bruising or bleeding. No swollen lymph nodes    PHYSICAL EXAM:   /61   Pulse 85   Temp 96.7 °F (35.9 °C) (Axillary)   Resp 18   Ht 5' 11\" (1.803 m)   Wt 193 lb (87.5 kg)   SpO2 97%   BMI 26.92 kg/m²   CONST:  Well developed, well nourished elderly male who appears of stated age. Awake, alert and cooperative. No apparent distress. HEENT:   Head- Normocephalic, atraumatic   Eyes- Conjunctivae pink, anicteric  Throat- Oral mucosa pink and moist  Neck-  No stridor, trachea midline, no jugular venous distention. No carotid bruit. CHEST: Chest symmetrical and non-tender to palpation. No accessory muscle use or intercostal retractions  RESPIRATORY: Lung sounds - clear throughout fields   CARDIOVASCULAR:     Heart Inspection- shows no noted pulsations  Heart Palpation- no heaves or thrills; PMI is non-displaced   Heart Ausculation- Regular rate and rhythm, no murmur. No s3, s4 or rub   PV: No lower extremity edema. No varicosities.  Pedal pulses palpable, no clubbing or cyanosis   ABDOMEN: Soft, obese, non-tender to light palpation. Bowel sounds present. No palpable masses no organomegaly; no abdominal bruit  MS: Good muscle strength and tone. No atrophy or abnormal movements. : Deferred  SKIN: Warm and dry no statis dermatitis or ulcers   NEURO / PSYCH: Oriented to person, place and time. Speech clear and appropriate. Follows all commands. Pleasant affect     DATA:    EC2020: Normal sinus rhythm, heart rate 100 bpm, QT/QTc 344/443 ms  Tele strips: Sinus rhythm (please see HPI for telemetry events). Diagnostic:      Intake/Output Summary (Last 24 hours) at 6/10/2020 0836  Last data filed at 6/10/2020 0725  Gross per 24 hour   Intake 7093.28 ml   Output 450 ml   Net 6643.28 ml       Labs:   CBC:   Recent Labs     06/09/20  0330 06/10/20  0330   WBC 16.6* 14.9*   HGB 12.6 12.7   HCT 39.4 39.5   * 114*     BMP:   Recent Labs     06/09/20  0330 06/10/20  0330    136   K 4.2 3.6   CO2 22 23   BUN 25* 25*   CREATININE 1.0 1.0   LABGLOM >60 >60   CALCIUM 8.8 8.7     Mag: No results for input(s): MG in the last 72 hours. Phos: No results for input(s): PHOS in the last 72 hours. TSH:   Recent Labs     20   TSH 0.270     HgA1c: No results found for: LABA1C  No results found for: EAG  proBNP: No results for input(s): PROBNP in the last 72 hours. PT/INR:   Recent Labs     20  1824 20   PROTIME 16.0* 14.5*   INR 1.3 1.2     APTT:  Recent Labs     20   APTT 31.2     CARDIAC ENZYMES:  Recent Labs     20  1336   TROPONINI <0.01     FASTING LIPID PANEL:  Lab Results   Component Value Date    CHOL 106 2020    HDL 40 2020    LDLCALC 49 2020    TRIG 87 2020     LIVER PROFILE:  Recent Labs     20  0330 06/10/20  033   AST 67* 30   * 60*   LABALBU 2.9* 2.5*     CT abdomen/pelvis: 2020  1. Findings suggest acute pancreatitis. Clinical correlation   recommended.       2. Prominent diameter of the common bile duct.  No evidence of   calcified choledocholithiasis or pancreatic head mass. MRCP may be   helpful for further evaluation.       3. Nonspecific periportal edema. Some etiologies include acute   hepatitis or cholangitis.       4. Subtle fat stranding is seen surrounding the gallbladder. Clinical   correlation recommended for possibility of cholecystitis.       5. Severe diverticulosis involving sigmoid colon. No evidence of acute   diverticulitis. Ultrasound gallbladder right upper quadrant: 6/7/2020  Moderate distention of the gallbladder with out evidence of   cholecystitis.         MRI abdomen without contrast: 6/8/2020  The extrahepatic biliary tree/common bile duct is ectatic measuring 9   mm diameter distally, but no filling defects are identified, and no   adjacent inflammatory findings are evident. There is no evidence of   pancreatitis or pancreatic ductal ectasia, and no filling defects are   identified. If there are concerns regarding a stenosis at the   sphincter, ERCP would still be appropriate, would allow biopsy if   warranted, and could be therapeutic.       Subtle fluid is identified adjacent the gallbladder, and along the   upper right lobe of the liver below the diaphragm, but no other   significant gallbladder pathology is noted.        There is diffuse fatty atrophy of the pancreas     TTE: 6/7/2020 (Dr. Jordi Magaña):  Normal left ventricular systolic function. Ejection fraction is visually estimated at 55%. Normal right ventricular size and function. Indeterminate diastolic function. Mildly dilated left atrium by volume index. Mild tricuspid regurgitation. PASP is estimated at 30 mmHg. Assessment/Plan as per Dr. Jordi Magaña:  1. Admitted on 6/7/2020 with diffuse periumbilical abdominal pain and found to have acute pancreatitis, jaundice and abnormal LFTs s/p ERCP with papillotomy, stone extraction, and stent Placement 6/10/2020.  2. New onset of Afib/flutter: SR on admission.  2 brief episodes of Afib/flutter  with

## 2020-06-10 NOTE — PROGRESS NOTES
650 mg, Q6H PRN  magnesium hydroxide, 30 mL, Daily PRN  promethazine, 12.5 mg, Q6H PRN    Or  ondansetron, 4 mg, Q6H PRN  morphine, 2 mg, Q2H PRN    Or  morphine, 4 mg, Q2H PRN         Objective:    /88   Pulse 82   Temp 97.9 °F (36.6 °C) (Oral)   Resp 18   Ht 5' 11\" (1.803 m)   Wt 193 lb (87.5 kg)   SpO2 97%   BMI 26.92 kg/m²   General Appearance: alert and oriented to person, place and time, well-developed and well-nourished, in no acute distress  Skin: warm and dry, no rash or erythema  Head: normocephalic and atraumatic  Eyes: pupils equal, round, and reactive to light, extraocular eye movements intact, conjunctivae normal  ENT: tympanic membrane, external ear and ear canal normal bilaterally, oropharynx clear and moist with normal mucous membranes  Neck: neck supple and non tender without mass, no thyromegaly or thyroid nodules, no cervical lymphadenopathy   Pulmonary/Chest: clear to auscultation bilaterally- no wheezes, rales or rhonchi, normal air movement, no respiratory distress  Cardiovascular: normal rate, normal S1 and S2, no gallops, intact distal pulses and no carotid bruits  Abdomen: soft, non-tender, non-distended, normal bowel sounds, no masses or organomegaly      Recent Labs     06/08/20 0413 06/09/20  0330 06/10/20  0330    138 136   K 4.3 4.2 3.6    106 105   CO2 21* 22 23   BUN 29* 25* 25*   CREATININE 1.4* 1.0 1.0   GLUCOSE 115* 114* 77   CALCIUM 8.9 8.8 8.7       Recent Labs     06/08/20 0413 06/09/20  0330 06/10/20  0330   WBC 19.2* 16.6* 14.9*   RBC 4.74 4.44 4.52   HGB 13.5 12.6 12.7   HCT 41.8 39.4 39.5   MCV 88.2 88.7 87.4   MCH 28.5 28.4 28.1   MCHC 32.3 32.0 32.2   RDW 15.7* 15.9* 15.9*   * 107* 114*   MPV 12.1* 11.8 12.2*       Lipase has improved to 90  Amylase 109  Bili 2.3  Tox screen positive for opiates on admission  WBC 16.6    Iron saturation 7/TIBC 234  Hepatitis screen negative  Results of MRI pending /ERCP results patient seen by GI, did have ERCP,   3. Leukocytosis, blood cultures were done, patient was also started on Zosyn. Infectious disease has seen the accelerated antibiotic to Rocephin  4. Sepsis is noted with the fever, leukocytosis, bacteremia. 5. A. fib, for brief, Back in sinus rhythm, patient seen by cardiology, will eventually go on Eliquis when can be given uninterrupted. 6. If down the line cholecystectomy needed then surgeon preference is Dr. Steffanie Baker. 7. DC planning likely in a day or so once okay with all subspecialties.         Electronically signed by Jorge Hutton MD on 6/10/2020 at 3:28 PM

## 2020-06-10 NOTE — CARE COORDINATION
SOCIAL WORK / DISCHARGE PLANNING:  Sw met with pt and wife at bedside to discuss transition to care. Pt reports independence, no dc needs identified or requested at this time. DC plan to return home with spouse who can provide home going transport. ID following on IV Zosyn.              Electronically signed by KEELY Gerber on 6/10/2020 at 3:30 PM

## 2020-06-10 NOTE — PROGRESS NOTES
[address] [place] [address] [facility]  [facility]  Progress Note  CC: abdominal pain  Face to face encounter   SUBJECTIVE:  The patient feels well. Tolerating antibiotics. No abdominal pain unless he coughs or has a hiccup. No nausea, vomiting or diarrhea. Review of systems: As above, otherwise unremarkable    Medications:  Scheduled Meds:   metoprolol tartrate  25 mg Oral BID    pantoprazole  40 mg Intravenous BID    And    sodium chloride (PF)  10 mL Intravenous BID    piperacillin-tazobactam  3.375 g Intravenous Q8H    indomethacin  100 mg Rectal 60 Min Pre-Op    sodium chloride flush  10 mL Intravenous 2 times per day    heparin (porcine)  5,000 Units Subcutaneous 3 times per day     Continuous Infusions:   sodium chloride 12.5 mL/hr at 06/10/20 0607    lactated ringers 150 mL/hr at 06/10/20 0608     PRN Meds:dilTIAZem, HYDROmorphone, perflutren lipid microspheres, sodium chloride flush, acetaminophen **OR** acetaminophen, magnesium hydroxide, promethazine **OR** ondansetron, morphine **OR** morphine  OBJECTIVE:  Patient Vitals for the past 24 hrs:   BP Temp Temp src Pulse Resp SpO2 Weight   06/10/20 0817 110/61 96.7 °F (35.9 °C) Axillary 85 18 97 % --   06/10/20 0015 110/82 98 °F (36.7 °C) Oral 124 16 95 % 193 lb (87.5 kg)   06/09/20 2130 100/84 -- -- 110 -- 95 % --   06/09/20 1645 118/85 97.5 °F (36.4 °C) Oral 108 16 94 % --   06/09/20 1615 105/65 -- -- 110 16 95 % --   06/09/20 1600 (!) 100/55 -- -- 126 20 94 % --   06/09/20 1545 122/83 -- -- 130 20 -- --   06/09/20 1524 (!) 135/102 -- -- 138 18 95 % --   06/09/20 1509 (!) 156/71 97.1 °F (36.2 °C) Temporal 128 16 95 % --   06/09/20 1451 (!) 148/84 96.8 °F (36 °C) Tympanic 86 14 96 % --     Constitutional: The patient is lying in bed. He is awake and alert. No distress. Wife present. Skin: Warm and dry. No rashes were noted. Head: Eyes show round, and reactive pupils. + jaundice- improving . Less jaundice.   Mouth: Moist mucous oral antibiotic    Barbara Crown  11:57 AM  6/10/2020

## 2020-06-10 NOTE — PROGRESS NOTES
PROGRESS NOTE    Patient Presents with/Seen in Consultation For      *acute pancreatitis  CHIEF COMPLAINT: Abdominal pain, nausea, vomiting, chills, and fever    Subjective:     Patient denies abd pain at rest, states only \"a little sore if you press on the right upper side. \" Pt denies nausea or vomiting. Asking to eat \"food. \"  Pt reports diarrhea of loose brown stool x 1 this am. Pt wife at [facility]. ERCP results d/w pt and his wife at length with all questions answered. Pt had AF RVR yesterday, cardiology consulted. Review of Systems  Aside from what was mentioned in the PMH and HPI, essentially unremarkable, all others negative. Objective:     /61   Pulse 85   Temp 96.7 °F (35.9 °C) (Axillary)   Resp 18   Ht 5' 11\" (1.803 m)   Wt 193 lb (87.5 kg)   SpO2 97%   BMI 26.92 kg/m²     General appearance: alert, awake, laying in bed with wife at [facility] in no apparent distress, and cooperative  Eyes: conjunctiva pink, sclera anicteric. PERRL.   Lungs: clear to auscultation bilaterally  Heart: irregular rate and rhythm, no murmur, 2+ pulses; no edema  Abdomen: softly distended, RUQ mildly tender to palpation without guarding or rebound; bowel sounds normal; no masses,  no organomegaly  Extremities: extremities without edema  Pulses: 2+ and symmetric  Skin: Skin color, texture, turgor normal.   Neurologic: Grossly normal    dilTIAZem injection 25 mg, PRN  HYDROmorphone (DILAUDID) injection 0.5 mg, Q4H PRN  metoprolol tartrate (LOPRESSOR) tablet 25 mg, BID  perflutren lipid microspheres (DEFINITY) injection 1.65 mg, ONCE PRN  pantoprazole (PROTONIX) injection 40 mg, BID    And  sodium chloride (PF) 0.9 % injection 10 mL, BID  piperacillin-tazobactam (ZOSYN) 3.375 g in dextrose 5 % 50 mL IVPB extended infusion (mini-bag), Q8H    And  0.9 % sodium chloride infusion, Q8H  indomethacin (INDOCIN) 50 MG suppository 100 mg, 60 Min Pre-Op  sodium chloride flush 0.9 % injection 10 mL, 2 times per day  sodium chloride flush

## 2020-06-11 VITALS
WEIGHT: 192.44 LBS | BODY MASS INDEX: 26.94 KG/M2 | TEMPERATURE: 98.3 F | RESPIRATION RATE: 18 BRPM | DIASTOLIC BLOOD PRESSURE: 82 MMHG | HEART RATE: 97 BPM | SYSTOLIC BLOOD PRESSURE: 138 MMHG | HEIGHT: 71 IN | OXYGEN SATURATION: 93 %

## 2020-06-11 LAB
ALBUMIN SERPL-MCNC: 2.8 G/DL (ref 3.5–5.2)
ALP BLD-CCNC: 202 U/L (ref 40–129)
ALT SERPL-CCNC: 44 U/L (ref 0–40)
AMYLASE: 28 U/L (ref 20–100)
ANION GAP SERPL CALCULATED.3IONS-SCNC: 9 MMOL/L (ref 7–16)
AST SERPL-CCNC: 25 U/L (ref 0–39)
BASOPHILS ABSOLUTE: 0 E9/L (ref 0–0.2)
BASOPHILS RELATIVE PERCENT: 0 % (ref 0–2)
BILIRUB SERPL-MCNC: 1.7 MG/DL (ref 0–1.2)
BUN BLDV-MCNC: 18 MG/DL (ref 8–23)
BURR CELLS: ABNORMAL
CALCIUM SERPL-MCNC: 9.1 MG/DL (ref 8.6–10.2)
CHLORIDE BLD-SCNC: 106 MMOL/L (ref 98–107)
CO2: 24 MMOL/L (ref 22–29)
CREAT SERPL-MCNC: 0.9 MG/DL (ref 0.7–1.2)
EOSINOPHILS ABSOLUTE: 0.14 E9/L (ref 0.05–0.5)
EOSINOPHILS RELATIVE PERCENT: 0.9 % (ref 0–6)
GFR AFRICAN AMERICAN: >60
GFR NON-AFRICAN AMERICAN: >60 ML/MIN/1.73
GLUCOSE BLD-MCNC: 113 MG/DL (ref 74–99)
HCT VFR BLD CALC: 39.4 % (ref 37–54)
HEMOGLOBIN: 12.8 G/DL (ref 12.5–16.5)
LIPASE: 38 U/L (ref 13–60)
LYMPHOCYTES ABSOLUTE: 0.77 E9/L (ref 1.5–4)
LYMPHOCYTES RELATIVE PERCENT: 5.2 % (ref 20–42)
MCH RBC QN AUTO: 28.3 PG (ref 26–35)
MCHC RBC AUTO-ENTMCNC: 32.5 % (ref 32–34.5)
MCV RBC AUTO: 87 FL (ref 80–99.9)
MONOCYTES ABSOLUTE: 0.61 E9/L (ref 0.1–0.95)
MONOCYTES RELATIVE PERCENT: 4.3 % (ref 2–12)
NEUTROPHILS ABSOLUTE: 13.77 E9/L (ref 1.8–7.3)
NEUTROPHILS RELATIVE PERCENT: 89.6 % (ref 43–80)
NUCLEATED RED BLOOD CELLS: 0 /100 WBC
OVALOCYTES: ABNORMAL
PDW BLD-RTO: 15.9 FL (ref 11.5–15)
PLATELET # BLD: 146 E9/L (ref 130–450)
PMV BLD AUTO: 12.4 FL (ref 7–12)
POIKILOCYTES: ABNORMAL
POTASSIUM SERPL-SCNC: 3.7 MMOL/L (ref 3.5–5)
RBC # BLD: 4.53 E12/L (ref 3.8–5.8)
SCHISTOCYTES: ABNORMAL
SODIUM BLD-SCNC: 139 MMOL/L (ref 132–146)
TOTAL PROTEIN: 6.1 G/DL (ref 6.4–8.3)
WBC # BLD: 15.3 E9/L (ref 4.5–11.5)

## 2020-06-11 PROCEDURE — 36415 COLL VENOUS BLD VENIPUNCTURE: CPT

## 2020-06-11 PROCEDURE — 82150 ASSAY OF AMYLASE: CPT

## 2020-06-11 PROCEDURE — 80053 COMPREHEN METABOLIC PANEL: CPT

## 2020-06-11 PROCEDURE — 6360000002 HC RX W HCPCS: Performed by: INTERNAL MEDICINE

## 2020-06-11 PROCEDURE — 6370000000 HC RX 637 (ALT 250 FOR IP): Performed by: INTERNAL MEDICINE

## 2020-06-11 PROCEDURE — C9113 INJ PANTOPRAZOLE SODIUM, VIA: HCPCS | Performed by: INTERNAL MEDICINE

## 2020-06-11 PROCEDURE — 99239 HOSP IP/OBS DSCHRG MGMT >30: CPT | Performed by: FAMILY MEDICINE

## 2020-06-11 PROCEDURE — 83690 ASSAY OF LIPASE: CPT

## 2020-06-11 PROCEDURE — 2580000003 HC RX 258: Performed by: INTERNAL MEDICINE

## 2020-06-11 PROCEDURE — 85025 COMPLETE CBC W/AUTO DIFF WBC: CPT

## 2020-06-11 RX ORDER — CEFUROXIME AXETIL 500 MG/1
500 TABLET ORAL EVERY 12 HOURS SCHEDULED
Qty: 20 TABLET | Refills: 0 | Status: SHIPPED | OUTPATIENT
Start: 2020-06-11 | End: 2020-06-21

## 2020-06-11 RX ORDER — CEFUROXIME AXETIL 500 MG/1
500 TABLET ORAL EVERY 12 HOURS SCHEDULED
Status: DISCONTINUED | OUTPATIENT
Start: 2020-06-11 | End: 2020-06-11 | Stop reason: HOSPADM

## 2020-06-11 RX ADMIN — HEPARIN SODIUM 5000 UNITS: 10000 INJECTION INTRAVENOUS; SUBCUTANEOUS at 06:28

## 2020-06-11 RX ADMIN — METOPROLOL TARTRATE 25 MG: 25 TABLET, FILM COATED ORAL at 09:30

## 2020-06-11 RX ADMIN — PANTOPRAZOLE SODIUM 40 MG: 40 INJECTION, POWDER, FOR SOLUTION INTRAVENOUS at 06:28

## 2020-06-11 RX ADMIN — SODIUM CHLORIDE, PRESERVATIVE FREE 10 ML: 5 INJECTION INTRAVENOUS at 09:30

## 2020-06-11 ASSESSMENT — PAIN SCALES - GENERAL
PAINLEVEL_OUTOF10: 0

## 2020-06-11 NOTE — PROGRESS NOTES
[address] [place] [address] [facility]  Progress Note  CC: abdominal pain- improving   Face to face encounter   SUBJECTIVE:  The patient feels well. Tolerating antibiotics. Abdominal pain improved   No nausea, vomiting or diarrhea. Has been afebrile  Wife at bedside. Review of systems: As above, otherwise unremarkable    Medications:  Scheduled Meds:   cefTRIAXone (ROCEPHIN) IV  2 g Intravenous Q24H    metoprolol tartrate  25 mg Oral BID    pantoprazole  40 mg Intravenous BID    And    sodium chloride (PF)  10 mL Intravenous BID    indomethacin  100 mg Rectal 60 Min Pre-Op    sodium chloride flush  10 mL Intravenous 2 times per day    heparin (porcine)  5,000 Units Subcutaneous 3 times per day     Continuous Infusions:    PRN Meds:dilTIAZem, HYDROmorphone, perflutren lipid microspheres, sodium chloride flush, acetaminophen **OR** acetaminophen, magnesium hydroxide, promethazine **OR** ondansetron, morphine **OR** morphine  OBJECTIVE:  Patient Vitals for the past 24 hrs:   BP Temp Temp src Pulse Resp SpO2 Weight   06/11/20 0733 138/82 98.3 °F (36.8 °C) Oral 97 18 93 % --   06/11/20 0154 -- -- -- -- -- -- 192 lb 7 oz (87.3 kg)   06/11/20 0015 128/78 98 °F (36.7 °C) Oral 80 16 95 % --   06/10/20 2130 (!) 158/88 -- -- 76 -- 95 % --   06/10/20 1458 136/88 97.9 °F (36.6 °C) Oral 82 18 -- --     Constitutional: The patient is lying in bed. He is awake and alert. No distress. Wife present. Skin: Warm and dry. No rashes were noted. Head: Eyes show round, and reactive pupils. + jaundice- improving . Less jaundice. Mouth: Moist mucous membranes, no ulcerations, no thrush. Neck: Supple to movements. No lymphadenopathy. Chest: No respiratory distress. Good breath sounds. Cardiovascular: Heart sounds rhythmic and regular. Abdomen: Round, soft. Soft to palpation. Abdominal pain improved. Extremities: No edema. Bilateral TKR surgical wounds well-healed.   No The patient is doing well clinically. He is tolerating oral intake. Ceftriaxone can be changed over to Cefuroxime for a minimum of 10 days. He can be discharged. Instructed to call the office and make a follow-up appointment.     Alysha Dunn  6/11/2020  12:38 PM

## 2020-06-11 NOTE — DISCHARGE SUMMARY
[facility] Physician Discharge Summary       Agustina Sep, [address] [facility] Kisha Tinoco William [address] [facility]  [phone]    Schedule an appointment as soon as possible for a visit      Alfredo Lucas MD  [address] [facility] [address]  [address]  [phone]    Schedule an appointment as soon as possible for a visit in 2 weeks      Naz Matilde, [address] [address]  [phone]    Schedule an appointment as soon as possible for a visit        Activity level: As tolerated     Dispo:home      Condition on discharge: Stable     Patient ID:  Jessica Miles  [id number]  88 y.o.  1945    Admit date: 6/7/2020    Discharge date and time:  6/11/2020  7:02 PM    Admission Diagnoses: Principal Problem:    Acute pancreatitis  Active Problems:    Acute pancreatitis without infection or necrosis    Hypertension    Arthritis  Resolved Problems:    * No resolved hospital problems. *      Discharge Diagnoses: Principal Problem:    Acute pancreatitis  Active Problems:    Acute pancreatitis without infection or necrosis    Hypertension    Arthritis  Resolved Problems:    * No resolved hospital problems. *      Consults:  IP CONSULT TO GI  IP CONSULT TO INFECTIOUS DISEASES  IP CONSULT TO CARDIOLOGY    Procedures: MRCP, ERCP    Hospital Course:   Patient Jessica Miles is a 69 y.o. presented with Acute pancreatitis without infection or necrosis [K85.90]  Acute pancreatitis without infection or necrosis [K85.90]  Patient is a 68 y/o male who was admitted on 6/7/20 and discharged on 6/11/20. He has a PMH of essential hypertension and arthritis. He was admitted via the Ed for abdominal pain with nausea, vomiting, and low grade fever. US of his GB revealed moderate distention of the GB without cholecystitis, CT showed acute pancreatitis and a prominent CBD. Patient was kept NPO and given IVF. Surgery, GI and cardiology were consulted.   He continued to improve over the following days. He was given zosyn. He underwent ERCP with stent placement. His symptoms were much better following the procedure. His lipase and LFTs trended downward. He will follow up with GI as outpatient and if needed with gen surg for cholecystectomy down the line. 1.  Acute pancreatitis-improved with ercp  2. transaminitis-improved with ercp  3. Sepsis secondary to acute cholecystitis-positive blood culture, fever, leukopenia, elevated lactic acid, elevated crp 28.6, tachycardia; treated with abx  4. BHUPINDER-improved  5. HTN-controlled  6. BPH-controlled with flomax  7. Inflammatory arthropathy-continue plaquenil. Discharge Exam:    General Appearance: alert and oriented to person, place and time and in no acute distress  Skin: warm and dry  Head: normocephalic and atraumatic  Eyes: pupils equal, round, and reactive to light, extraocular eye movements intact, conjunctivae normal  Neck: neck supple and non tender without mass   Pulmonary/Chest: clear to auscultation bilaterally- no wheezes, rales or rhonchi, normal air movement, no respiratory distress  Cardiovascular: normal rate, normal S1 and S2 and no carotid bruits  Abdomen: soft, non-tender, non-distended, normal bowel sounds, no masses or organomegaly  Extremities: no cyanosis, no clubbing and no edema  Neurologic: no cranial nerve deficit and speech normal    I/O last 3 completed shifts: In: 370 [P.O.:360; I.V.:10]  Out: -   No intake/output data recorded.       LABS:  Recent Labs     06/09/20  0330 06/10/20  0330 06/11/20  0805    136 139   K 4.2 3.6 3.7    105 106   CO2 22 23 24   BUN 25* 25* 18   CREATININE 1.0 1.0 0.9   GLUCOSE 114* 77 113*   CALCIUM 8.8 8.7 9.1       Recent Labs     06/09/20  0330 06/10/20  0330 06/11/20  0805   WBC 16.6* 14.9* 15.3*   RBC 4.44 4.52 4.53   HGB 12.6 12.7 12.8   HCT 39.4 39.5 39.4   MCV 88.7 87.4 87.0   MCH 28.4 28.1 28.3   MCHC 32.0 32.2 32.5   RDW 15.9* 15.9* 15.9*   * diverticulosis involving sigmoid colon. No evidence of acute diverticulitis. ALERT:  THIS IS AN ABNORMAL REPORT    Us Gallbladder Ruq    Result Date: 2020  Patient MRN:  [id number] : 1945 Age: 69 years Gender: Male Order Date:  2020 3:45 PM EXAM: US GALLBLADDER RUQ NUMBER OF IMAGES:  34 INDICATION:  pancreatitis; r/o acute cholecystistis pancreatitis; r/o acute cholecystistis COMPARISON: None The common duct is upper limits of normal measuring 7 mm. The gallbladder wall appears unremarkable. The gallbladder is moderately distended. Calculi are not identified. Sludge is not identified. There is now well-visualized. Moderate distention of the gallbladder with out evidence of cholecystitis. Mri Abdomen Wo Contrast Mrcp    Result Date: 2020  Patient MRN:  [id number] : 1945 Age: 69 years Gender: Male Order Date:  2020 6:00 PM EXAM: MRI ABDOMEN WO CONTRAST MRCP NUMBER OF IMAGES \ views:  411 INDICATION: 7 mm diameter common bile duct reported on right upper quadrant abdominal ultrasound 2020, and similar finding on CT abdomen and pelvis with contrast on the same date. COMPARISON: Aforementioned ultrasound and CT studies from yesterday TECHNIQUE: Multiple sequences of the abdomen with sagittal and coronal MPR reconstructions were obtained from the top of the diaphragm to the lower abdomen without gadolinium contrast. Imaging was performed on a 1.5 [employer] Sign[employer] magnet, and includes 3-D MRCP and projection images of the biliary tree. Ettie Armstrong FINDINGS: The distended gallbladder shows normal mural thickness and no intraluminal debris. There appears to be some trace pericholecystic fluid. The cystic duct is unremarkable. The intrahepatic biliary tree is not ectatic, but the extrahepatic biliary tree/common bile duct is ectatic measuring up to 9 mm diameter.  The duct tapers abruptly at the sphincter, and if there are concerns regarding a stricture or mass involving the sphincter, ERCP

## 2020-06-11 NOTE — PROGRESS NOTES
PROGRESS NOTE    Patient Presents with/Seen in Consultation For      *acute pancreatitis  CHIEF COMPLAINT: Abdominal pain, nausea, vomiting, chills, and fever    Subjective:     Patient up ambulating around room, in NAD. States he feels \"great\". Tolerating diet. Denies any discomfort. POC reviewed with the patient, all questions answered. Wallstent removal as OP, office to arrange. Review of Systems  Aside from what was mentioned in the PMH and HPI, essentially unremarkable, all others negative. Objective:     /82   Pulse 97   Temp 98.3 °F (36.8 °C) (Oral)   Resp 18   Ht 5' 11\" (1.803 m)   Wt 192 lb 7 oz (87.3 kg)   SpO2 93%   BMI 26.84 kg/m²     General appearance: alert, awake, and cooperative  Eyes: conjunctiva pink, sclera anicteric. PERRL. Lungs: clear to auscultation bilaterally  Heart: irregular rate and rhythm, no murmur, 2+ pulses; no edema  Abdomen: softly distended, non-tender, bowel sounds normal; no masses  Extremities: extremities without edema  Pulses: 2+ and symmetric  Skin: Skin color, texture, turgor normal.   Neurologic: Grossly normal    No current facility-administered medications for this encounter.         Data Review  CBC:   Lab Results   Component Value Date    WBC 15.3 06/11/2020    RBC 4.53 06/11/2020    HGB 12.8 06/11/2020    HCT 39.4 06/11/2020    MCV 87.0 06/11/2020    MCH 28.3 06/11/2020    MCHC 32.5 06/11/2020    RDW 15.9 06/11/2020     06/11/2020    MPV 12.4 06/11/2020     CMP:    Lab Results   Component Value Date     06/11/2020    K 3.7 06/11/2020     06/11/2020    CO2 24 06/11/2020    BUN 18 06/11/2020    CREATININE 0.9 06/11/2020    GFRAA >60 06/11/2020    LABGLOM >60 06/11/2020    GLUCOSE 113 06/11/2020    PROT 6.1 06/11/2020    LABALBU 2.8 06/11/2020    CALCIUM 9.1 06/11/2020    BILITOT 1.7 06/11/2020    ALKPHOS 202 06/11/2020    AST 25 06/11/2020    ALT 44 06/11/2020     Hepatic Function Panel:    Lab Results   Component Value Date

## 2020-06-12 ENCOUNTER — TELEPHONE (OUTPATIENT)
Dept: CARDIOLOGY CLINIC | Age: 75
End: 2020-06-12

## 2020-06-12 LAB — CA 19-9: 114 U/ML (ref 0–37)

## 2020-06-13 LAB
CULTURE, BLOOD 2: NORMAL
ORGANISM: ABNORMAL

## 2020-06-14 LAB — BLOOD CULTURE, ROUTINE: NORMAL

## 2020-06-24 ENCOUNTER — HOSPITAL ENCOUNTER (INPATIENT)
Age: 75
LOS: 1 days | Discharge: HOME | DRG: 441 | End: 2020-06-25
Attending: EMERGENCY MEDICINE | Admitting: INTERNAL MEDICINE
Payer: MEDICARE

## 2020-06-24 ENCOUNTER — APPOINTMENT (OUTPATIENT)
Dept: CT IMAGING | Age: 75
DRG: 441 | End: 2020-06-24
Payer: MEDICARE

## 2020-06-24 PROBLEM — E43 SEVERE PROTEIN-CALORIE MALNUTRITION (HCC): Chronic | Status: ACTIVE | Noted: 2020-06-24

## 2020-06-24 PROBLEM — K75.0 HEPATIC ABSCESS: Status: ACTIVE | Noted: 2020-06-24

## 2020-06-24 LAB
ALBUMIN SERPL-MCNC: 3.7 G/DL (ref 3.5–5.2)
ALP BLD-CCNC: 191 U/L (ref 40–129)
ALT SERPL-CCNC: 30 U/L (ref 0–40)
ANION GAP SERPL CALCULATED.3IONS-SCNC: 10 MMOL/L (ref 7–16)
AST SERPL-CCNC: 29 U/L (ref 0–39)
BASOPHILS ABSOLUTE: 0.11 E9/L (ref 0–0.2)
BASOPHILS RELATIVE PERCENT: 0.7 % (ref 0–2)
BILIRUB SERPL-MCNC: 0.6 MG/DL (ref 0–1.2)
BILIRUBIN DIRECT: 0.2 MG/DL (ref 0–0.3)
BILIRUBIN URINE: NEGATIVE
BILIRUBIN, INDIRECT: 0.4 MG/DL (ref 0–1)
BLOOD, URINE: NEGATIVE
BUN BLDV-MCNC: 18 MG/DL (ref 8–23)
CALCIUM SERPL-MCNC: 9.7 MG/DL (ref 8.6–10.2)
CHLORIDE BLD-SCNC: 104 MMOL/L (ref 98–107)
CLARITY: CLEAR
CO2: 25 MMOL/L (ref 22–29)
COLOR: YELLOW
CREAT SERPL-MCNC: 1.1 MG/DL (ref 0.7–1.2)
EKG ATRIAL RATE: 69 BPM
EKG P AXIS: 17 DEGREES
EKG P-R INTERVAL: 156 MS
EKG Q-T INTERVAL: 404 MS
EKG QRS DURATION: 88 MS
EKG QTC CALCULATION (BAZETT): 432 MS
EKG R AXIS: 42 DEGREES
EKG T AXIS: 19 DEGREES
EKG VENTRICULAR RATE: 69 BPM
EOSINOPHILS ABSOLUTE: 0.19 E9/L (ref 0.05–0.5)
EOSINOPHILS RELATIVE PERCENT: 1.3 % (ref 0–6)
GFR AFRICAN AMERICAN: >60
GFR NON-AFRICAN AMERICAN: >60 ML/MIN/1.73
GLUCOSE BLD-MCNC: 114 MG/DL (ref 74–99)
GLUCOSE URINE: NEGATIVE MG/DL
HCT VFR BLD CALC: 39 % (ref 37–54)
HEMOGLOBIN: 12.2 G/DL (ref 12.5–16.5)
IMMATURE GRANULOCYTES #: 0.06 E9/L
IMMATURE GRANULOCYTES %: 0.4 % (ref 0–5)
INR BLD: 1.1
KETONES, URINE: NEGATIVE MG/DL
LACTIC ACID: 1.1 MMOL/L (ref 0.5–2.2)
LEUKOCYTE ESTERASE, URINE: NEGATIVE
LIPASE: 34 U/L (ref 13–60)
LYMPHOCYTES ABSOLUTE: 1.04 E9/L (ref 1.5–4)
LYMPHOCYTES RELATIVE PERCENT: 6.8 % (ref 20–42)
MCH RBC QN AUTO: 27.9 PG (ref 26–35)
MCHC RBC AUTO-ENTMCNC: 31.3 % (ref 32–34.5)
MCV RBC AUTO: 89.2 FL (ref 80–99.9)
MONOCYTES ABSOLUTE: 1.11 E9/L (ref 0.1–0.95)
MONOCYTES RELATIVE PERCENT: 7.3 % (ref 2–12)
NEUTROPHILS ABSOLUTE: 12.69 E9/L (ref 1.8–7.3)
NEUTROPHILS RELATIVE PERCENT: 83.5 % (ref 43–80)
NITRITE, URINE: NEGATIVE
PDW BLD-RTO: 14.7 FL (ref 11.5–15)
PH UA: 5.5 (ref 5–9)
PLATELET # BLD: 459 E9/L (ref 130–450)
PMV BLD AUTO: 9.9 FL (ref 7–12)
POTASSIUM REFLEX MAGNESIUM: 4.7 MMOL/L (ref 3.5–5)
PROTEIN UA: NEGATIVE MG/DL
PROTHROMBIN TIME: 13.3 SEC (ref 9.3–12.4)
RBC # BLD: 4.37 E12/L (ref 3.8–5.8)
SODIUM BLD-SCNC: 139 MMOL/L (ref 132–146)
SPECIFIC GRAVITY UA: >=1.03 (ref 1–1.03)
TOTAL PROTEIN: 7.2 G/DL (ref 6.4–8.3)
TROPONIN: <0.01 NG/ML (ref 0–0.03)
UROBILINOGEN, URINE: 0.2 E.U./DL
WBC # BLD: 15.2 E9/L (ref 4.5–11.5)

## 2020-06-24 PROCEDURE — 99999 PR OFFICE/OUTPT VISIT,PROCEDURE ONLY: CPT | Performed by: INTERNAL MEDICINE

## 2020-06-24 PROCEDURE — 6360000004 HC RX CONTRAST MEDICATION: Performed by: RADIOLOGY

## 2020-06-24 PROCEDURE — 2580000003 HC RX 258: Performed by: SPECIALIST

## 2020-06-24 PROCEDURE — 1200000000 HC SEMI PRIVATE

## 2020-06-24 PROCEDURE — 83605 ASSAY OF LACTIC ACID: CPT

## 2020-06-24 PROCEDURE — 2580000003 HC RX 258: Performed by: EMERGENCY MEDICINE

## 2020-06-24 PROCEDURE — 87040 BLOOD CULTURE FOR BACTERIA: CPT

## 2020-06-24 PROCEDURE — 6360000002 HC RX W HCPCS: Performed by: STUDENT IN AN ORGANIZED HEALTH CARE EDUCATION/TRAINING PROGRAM

## 2020-06-24 PROCEDURE — 6360000002 HC RX W HCPCS: Performed by: INTERNAL MEDICINE

## 2020-06-24 PROCEDURE — 80076 HEPATIC FUNCTION PANEL: CPT

## 2020-06-24 PROCEDURE — 6370000000 HC RX 637 (ALT 250 FOR IP): Performed by: INTERNAL MEDICINE

## 2020-06-24 PROCEDURE — 85025 COMPLETE CBC W/AUTO DIFF WBC: CPT

## 2020-06-24 PROCEDURE — 85610 PROTHROMBIN TIME: CPT

## 2020-06-24 PROCEDURE — 2580000003 HC RX 258: Performed by: INTERNAL MEDICINE

## 2020-06-24 PROCEDURE — 6360000002 HC RX W HCPCS: Performed by: EMERGENCY MEDICINE

## 2020-06-24 PROCEDURE — 74177 CT ABD & PELVIS W/CONTRAST: CPT

## 2020-06-24 PROCEDURE — 83690 ASSAY OF LIPASE: CPT

## 2020-06-24 PROCEDURE — 93010 ELECTROCARDIOGRAM REPORT: CPT | Performed by: INTERNAL MEDICINE

## 2020-06-24 PROCEDURE — 80048 BASIC METABOLIC PNL TOTAL CA: CPT

## 2020-06-24 PROCEDURE — 2580000003 HC RX 258: Performed by: RADIOLOGY

## 2020-06-24 PROCEDURE — 6360000002 HC RX W HCPCS: Performed by: SPECIALIST

## 2020-06-24 PROCEDURE — 93005 ELECTROCARDIOGRAM TRACING: CPT | Performed by: EMERGENCY MEDICINE

## 2020-06-24 PROCEDURE — 84484 ASSAY OF TROPONIN QUANT: CPT

## 2020-06-24 PROCEDURE — 81003 URINALYSIS AUTO W/O SCOPE: CPT

## 2020-06-24 PROCEDURE — 99285 EMERGENCY DEPT VISIT HI MDM: CPT

## 2020-06-24 PROCEDURE — 99223 1ST HOSP IP/OBS HIGH 75: CPT | Performed by: INTERNAL MEDICINE

## 2020-06-24 PROCEDURE — 36415 COLL VENOUS BLD VENIPUNCTURE: CPT

## 2020-06-24 RX ORDER — VITAMIN E 268 MG
400 CAPSULE ORAL DAILY
Status: DISCONTINUED | OUTPATIENT
Start: 2020-06-24 | End: 2020-06-25 | Stop reason: HOSPADM

## 2020-06-24 RX ORDER — 0.9 % SODIUM CHLORIDE 0.9 %
1000 INTRAVENOUS SOLUTION INTRAVENOUS ONCE
Status: COMPLETED | OUTPATIENT
Start: 2020-06-24 | End: 2020-06-24

## 2020-06-24 RX ORDER — LISINOPRIL 20 MG/1
40 TABLET ORAL DAILY
Status: DISCONTINUED | OUTPATIENT
Start: 2020-06-24 | End: 2020-06-25 | Stop reason: HOSPADM

## 2020-06-24 RX ORDER — OXYCODONE HYDROCHLORIDE 5 MG/1
5 TABLET ORAL EVERY 4 HOURS PRN
Status: DISCONTINUED | OUTPATIENT
Start: 2020-06-24 | End: 2020-06-24

## 2020-06-24 RX ORDER — ONDANSETRON 2 MG/ML
4 INJECTION INTRAMUSCULAR; INTRAVENOUS EVERY 6 HOURS PRN
Status: DISCONTINUED | OUTPATIENT
Start: 2020-06-24 | End: 2020-06-25 | Stop reason: HOSPADM

## 2020-06-24 RX ORDER — OXYCODONE HYDROCHLORIDE 5 MG/1
10 TABLET ORAL EVERY 4 HOURS PRN
Status: DISCONTINUED | OUTPATIENT
Start: 2020-06-24 | End: 2020-06-25 | Stop reason: HOSPADM

## 2020-06-24 RX ORDER — SODIUM CHLORIDE 0.9 % (FLUSH) 0.9 %
10 SYRINGE (ML) INJECTION EVERY 12 HOURS SCHEDULED
Status: DISCONTINUED | OUTPATIENT
Start: 2020-06-24 | End: 2020-06-25 | Stop reason: HOSPADM

## 2020-06-24 RX ORDER — MORPHINE SULFATE 2 MG/ML
2 INJECTION, SOLUTION INTRAMUSCULAR; INTRAVENOUS EVERY 4 HOURS PRN
Status: DISCONTINUED | OUTPATIENT
Start: 2020-06-24 | End: 2020-06-24 | Stop reason: SDUPTHER

## 2020-06-24 RX ORDER — PROMETHAZINE HYDROCHLORIDE 25 MG/1
12.5 TABLET ORAL EVERY 6 HOURS PRN
Status: DISCONTINUED | OUTPATIENT
Start: 2020-06-24 | End: 2020-06-25 | Stop reason: HOSPADM

## 2020-06-24 RX ORDER — OXYCODONE HYDROCHLORIDE 5 MG/1
5 TABLET ORAL EVERY 4 HOURS PRN
Status: DISCONTINUED | OUTPATIENT
Start: 2020-06-24 | End: 2020-06-25 | Stop reason: HOSPADM

## 2020-06-24 RX ORDER — ACETAMINOPHEN 650 MG/1
650 SUPPOSITORY RECTAL EVERY 6 HOURS PRN
Status: DISCONTINUED | OUTPATIENT
Start: 2020-06-24 | End: 2020-06-25 | Stop reason: HOSPADM

## 2020-06-24 RX ORDER — PSEUDOEPHEDRINE HCL 30 MG
1000 TABLET ORAL DAILY
Status: DISCONTINUED | OUTPATIENT
Start: 2020-06-24 | End: 2020-06-25 | Stop reason: HOSPADM

## 2020-06-24 RX ORDER — SODIUM CHLORIDE 0.9 % (FLUSH) 0.9 %
10 SYRINGE (ML) INJECTION EVERY 12 HOURS SCHEDULED
Status: DISCONTINUED | OUTPATIENT
Start: 2020-06-24 | End: 2020-06-24 | Stop reason: SDUPTHER

## 2020-06-24 RX ORDER — PANTOPRAZOLE SODIUM 40 MG/1
40 TABLET, DELAYED RELEASE ORAL
Status: DISCONTINUED | OUTPATIENT
Start: 2020-06-24 | End: 2020-06-25 | Stop reason: HOSPADM

## 2020-06-24 RX ORDER — ACETAMINOPHEN 325 MG/1
650 TABLET ORAL EVERY 6 HOURS PRN
Status: DISCONTINUED | OUTPATIENT
Start: 2020-06-24 | End: 2020-06-25 | Stop reason: HOSPADM

## 2020-06-24 RX ORDER — POLYETHYLENE GLYCOL 3350 17 G/17G
17 POWDER, FOR SOLUTION ORAL DAILY PRN
Status: DISCONTINUED | OUTPATIENT
Start: 2020-06-24 | End: 2020-06-25 | Stop reason: HOSPADM

## 2020-06-24 RX ORDER — VITAMIN B COMPLEX
5000 TABLET ORAL DAILY
Status: DISCONTINUED | OUTPATIENT
Start: 2020-06-24 | End: 2020-06-25 | Stop reason: HOSPADM

## 2020-06-24 RX ORDER — MORPHINE SULFATE 2 MG/ML
2 INJECTION, SOLUTION INTRAMUSCULAR; INTRAVENOUS EVERY 4 HOURS PRN
Status: DISCONTINUED | OUTPATIENT
Start: 2020-06-24 | End: 2020-06-25 | Stop reason: HOSPADM

## 2020-06-24 RX ORDER — SODIUM CHLORIDE 0.9 % (FLUSH) 0.9 %
10 SYRINGE (ML) INJECTION PRN
Status: DISCONTINUED | OUTPATIENT
Start: 2020-06-24 | End: 2020-06-24 | Stop reason: SDUPTHER

## 2020-06-24 RX ORDER — SODIUM CHLORIDE 0.9 % (FLUSH) 0.9 %
10 SYRINGE (ML) INJECTION PRN
Status: DISCONTINUED | OUTPATIENT
Start: 2020-06-24 | End: 2020-06-25 | Stop reason: HOSPADM

## 2020-06-24 RX ORDER — CHLORAL HYDRATE 500 MG
3000 CAPSULE ORAL DAILY
Status: DISCONTINUED | OUTPATIENT
Start: 2020-06-24 | End: 2020-06-24 | Stop reason: CLARIF

## 2020-06-24 RX ORDER — SODIUM CHLORIDE 9 MG/ML
INJECTION, SOLUTION INTRAVENOUS EVERY 8 HOURS
Status: DISCONTINUED | OUTPATIENT
Start: 2020-06-24 | End: 2020-06-24

## 2020-06-24 RX ORDER — ACETAMINOPHEN 325 MG/1
650 TABLET ORAL EVERY 4 HOURS PRN
Status: DISCONTINUED | OUTPATIENT
Start: 2020-06-24 | End: 2020-06-24 | Stop reason: SDUPTHER

## 2020-06-24 RX ORDER — SODIUM CHLORIDE 0.9 % (FLUSH) 0.9 %
10 SYRINGE (ML) INJECTION 2 TIMES DAILY
Status: DISCONTINUED | OUTPATIENT
Start: 2020-06-24 | End: 2020-06-24 | Stop reason: SDUPTHER

## 2020-06-24 RX ORDER — HYDROXYCHLOROQUINE SULFATE 200 MG/1
200 TABLET, FILM COATED ORAL DAILY
Status: DISCONTINUED | OUTPATIENT
Start: 2020-06-24 | End: 2020-06-25 | Stop reason: HOSPADM

## 2020-06-24 RX ADMIN — IOPAMIDOL 100 ML: 755 INJECTION, SOLUTION INTRAVENOUS at 04:45

## 2020-06-24 RX ADMIN — PIPERACILLIN AND TAZOBACTAM 3.38 G: 3; .375 INJECTION, POWDER, LYOPHILIZED, FOR SOLUTION INTRAVENOUS at 13:50

## 2020-06-24 RX ADMIN — SODIUM CHLORIDE 1000 ML: 9 INJECTION, SOLUTION INTRAVENOUS at 04:11

## 2020-06-24 RX ADMIN — METOPROLOL TARTRATE 25 MG: 25 TABLET, FILM COATED ORAL at 20:33

## 2020-06-24 RX ADMIN — Medication 10 ML: at 04:42

## 2020-06-24 RX ADMIN — PIPERACILLIN AND TAZOBACTAM 3.38 G: 3; .375 INJECTION, POWDER, FOR SOLUTION INTRAVENOUS at 06:04

## 2020-06-24 RX ADMIN — MORPHINE SULFATE 2 MG: 2 INJECTION, SOLUTION INTRAMUSCULAR; INTRAVENOUS at 14:53

## 2020-06-24 RX ADMIN — Medication 10 ML: at 09:00

## 2020-06-24 RX ADMIN — WATER 1 G: 1 INJECTION INTRAMUSCULAR; INTRAVENOUS; SUBCUTANEOUS at 16:18

## 2020-06-24 RX ADMIN — OXYCODONE HYDROCHLORIDE 5 MG: 5 TABLET ORAL at 11:40

## 2020-06-24 RX ADMIN — Medication 10 ML: at 20:34

## 2020-06-24 ASSESSMENT — PAIN SCALES - GENERAL
PAINLEVEL_OUTOF10: 4
PAINLEVEL_OUTOF10: 0
PAINLEVEL_OUTOF10: 7
PAINLEVEL_OUTOF10: 4
PAINLEVEL_OUTOF10: 7
PAINLEVEL_OUTOF10: 0

## 2020-06-24 ASSESSMENT — PAIN DESCRIPTION - PROGRESSION
CLINICAL_PROGRESSION: NOT CHANGED
CLINICAL_PROGRESSION: GRADUALLY WORSENING

## 2020-06-24 ASSESSMENT — ENCOUNTER SYMPTOMS
BACK PAIN: 1
SHORTNESS OF BREATH: 0
NAUSEA: 0
DIARRHEA: 0
COUGH: 0
ABDOMINAL PAIN: 1
VOMITING: 0

## 2020-06-24 ASSESSMENT — PAIN DESCRIPTION - ORIENTATION
ORIENTATION: MID

## 2020-06-24 ASSESSMENT — PAIN DESCRIPTION - ONSET
ONSET: SUDDEN
ONSET: ON-GOING

## 2020-06-24 ASSESSMENT — PAIN DESCRIPTION - DESCRIPTORS
DESCRIPTORS: ACHING;CONSTANT;DISCOMFORT

## 2020-06-24 ASSESSMENT — PAIN DESCRIPTION - LOCATION
LOCATION: ABDOMEN

## 2020-06-24 ASSESSMENT — PAIN DESCRIPTION - FREQUENCY
FREQUENCY: CONTINUOUS

## 2020-06-24 ASSESSMENT — PAIN DESCRIPTION - PAIN TYPE
TYPE: ACUTE PAIN

## 2020-06-24 ASSESSMENT — PAIN DESCRIPTION - DIRECTION
RADIATING_TOWARDS: LOWER BACK

## 2020-06-24 ASSESSMENT — PAIN - FUNCTIONAL ASSESSMENT: PAIN_FUNCTIONAL_ASSESSMENT: PREVENTS OR INTERFERES SOME ACTIVE ACTIVITIES AND ADLS

## 2020-06-24 NOTE — ED PROVIDER NOTES
Patient is a 53-year-old male with a past medical history of hypertension, pancreatitis presenting to the emergency department with abdominal pain. Symptoms moderate in severity and constant since onset. Patient was recently admitted a few weeks prior for pancreatitis and subsequently had an ERCP with a stent placement. He is follow-up with GI for stent removal next week. States his pain has been essentially resolved since discharge until midnight last night. He woke up out of bed with sudden back pain and when he sat up realize it was his abdomen that was hurting. He is not having any back pain at this current time is described all pain in his belly. Is been constant since awaking at midnight. Scribes it as an ache with intermittent sharpness throughout. Is worse in the right upper quadrant. He is taken nothing at home for his symptoms. Nothing makes them better and palpation makes them worse. He denies any fevers or chills. Denies nausea, vomiting, chest pain, shortness of breath, diarrhea, constipation. He states he was septic in the past because of this and wanted to \"get ahead of the game\" because he thinks this pain feels exactly how it was a few weeks prior. Review of Systems   Constitutional: Negative for chills and fever. Respiratory: Negative for cough and shortness of breath. Cardiovascular: Negative for chest pain. Gastrointestinal: Positive for abdominal pain. Negative for diarrhea, nausea and vomiting. Genitourinary: Negative for dysuria and frequency. Musculoskeletal: Positive for back pain. Negative for arthralgias. Skin: Negative for rash and wound. Neurological: Negative for weakness and headaches. All other systems reviewed and are negative. Physical Exam  Vitals signs and nursing note reviewed. Constitutional:       General: He is not in acute distress. Appearance: He is well-developed. He is not ill-appearing.    HENT:      Head: E9/L    Immature Granulocytes # 0.06 E9/L    Lymphocytes Absolute 1.04 (L) 1.50 - 4.00 E9/L    Monocytes Absolute 1.11 (H) 0.10 - 0.95 E9/L    Eosinophils Absolute 0.19 0.05 - 0.50 E9/L    Basophils Absolute 0.11 0.00 - 0.20 P6/K   Basic Metabolic Panel w/ Reflex to MG   Result Value Ref Range    Sodium 139 132 - 146 mmol/L    Potassium reflex Magnesium 4.7 3.5 - 5.0 mmol/L    Chloride 104 98 - 107 mmol/L    CO2 25 22 - 29 mmol/L    Anion Gap 10 7 - 16 mmol/L    Glucose 114 (H) 74 - 99 mg/dL    BUN 18 8 - 23 mg/dL    CREATININE 1.1 0.7 - 1.2 mg/dL    GFR Non-African American >60 >=60 mL/min/1.73    GFR African American >60     Calcium 9.7 8.6 - 10.2 mg/dL   Hepatic Function Panel   Result Value Ref Range    Total Protein 7.2 6.4 - 8.3 g/dL    Alb 3.7 3.5 - 5.2 g/dL    Alkaline Phosphatase 191 (H) 40 - 129 U/L    ALT 30 0 - 40 U/L    AST 29 0 - 39 U/L    Total Bilirubin 0.6 0.0 - 1.2 mg/dL    Bilirubin, Direct 0.2 0.0 - 0.3 mg/dL    Bilirubin, Indirect 0.4 0.0 - 1.0 mg/dL   Lipase   Result Value Ref Range    Lipase 34 13 - 60 U/L   Lactic Acid, Plasma   Result Value Ref Range    Lactic Acid 1.1 0.5 - 2.2 mmol/L   Troponin   Result Value Ref Range    Troponin <0.01 0.00 - 0.03 ng/mL   Urinalysis, reflex to microscopic   Result Value Ref Range    Color, UA Yellow Straw/Yellow    Clarity, UA Clear Clear    Glucose, Ur Negative Negative mg/dL    Bilirubin Urine Negative Negative    Ketones, Urine Negative Negative mg/dL    Specific Gravity, UA >=1.030 1.005 - 1.030    Blood, Urine Negative Negative    pH, UA 5.5 5.0 - 9.0    Protein, UA Negative Negative mg/dL    Urobilinogen, Urine 0.2 <2.0 E.U./dL    Nitrite, Urine Negative Negative    Leukocyte Esterase, Urine Negative Negative   EKG 12 Lead   Result Value Ref Range    Ventricular Rate 69 BPM    Atrial Rate 69 BPM    P-R Interval 156 ms    QRS Duration 88 ms    Q-T Interval 404 ms    QTc Calculation (Bazett) 432 ms    P Axis 17 degrees    R Axis 42 degrees    T Axis

## 2020-06-24 NOTE — PROGRESS NOTES
NPO  · Oral Nutrition Supplement (ONS) Orders: None  · Anthropometric Measures:  · Ht: 5' 11\" (180.3 cm)   · Current Body Wt: 175 lb 8 oz (79.6 kg)(6/24 bed scale)  · Usual Body Wt: 192 lb (87.1 kg)(6/7/2020 bed scale, per EMR)  · % Weight Change: 8.9% wt loss x 2 weeks  · Ideal Body Wt: 172 lb (78 kg), % Ideal Body 102%  · BMI Classification: BMI 18.5 - 24.9 Normal Weight    Nutrition Interventions:   Continue NPO  Continued Inpatient Monitoring, Education not appropriate at this time, Coordination of Care    Nutrition Evaluation:   · Evaluation: Goals set   · Goals: nutrition progression    · Monitoring: Nutrition Progression, Skin Integrity, I&O, Weight, Pertinent Labs, Monitor Bowel Function      Electronically signed by Gilford Cara, MS, RD, LD on 6/24/20 at 3:11 PM EDT    Contact Number: [phone]

## 2020-06-24 NOTE — PROGRESS NOTES
Pt admitted earlier today by night physician  D/w night physician  Chart reviewed and updated by nursing

## 2020-06-24 NOTE — CONSULTS
GENERAL SURGERY  CONSULT NOTE  6/24/2020    Physician Consulted: Dr. Cesario Rod  Reason for Consult: hepatic abscess  Referring Physician: Dr. Josette Lopez    Chief Complaint   Patient presents with    Abdominal Pain     Distension; Lower Back Pain; Surgery 6/9/20     HPI  Smooth Bowens is a 69 y.o. male who presents for evaluation of RUQ pain. Recent admission 2 weeks ago for gallstone pancreatitis with cholangitis. His labs at that time were notable for transaminitis, bilirubinemia, and lipase 2000 He was treated with antibiotics and underwent ERCP with stone extraction and stenting on 6/9. Course complicated also by Kenney Neat. He was discharged home and completed a course of cefuroxime for bacteremia 3 days ago. He started having severe RUQ pain again and nausea last night. No fevers or chills. His labs are notable for WBC 15 and normal LFTs, and CT showed some gallbladder distension, biliary stent in place, and some residual inflammation around the head of his pancreas. No prior abdominal surgeries. He receives colonoscopies regularly in [place], usually show polyps. 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 Past Medical History:   Diagnosis Date    Arthritis     Hypertension        Past Surgical History:   Procedure Laterality Date    ERCP N/A 6/9/2020    ERCP STONE REMOVAL performed by Cate Bran MD at [place] [address]      JOINT REPLACEMENT      TONSILLECTOMY         Medications Prior to Admission:    Prior to Admission medications    Medication Sig Start Date End Date Taking?  Authorizing Provider   metoprolol tartrate (LOPRESSOR) 25 MG tablet Take 1 tablet by mouth 2 times daily 6/11/20   Lilli Coy MD   hydroxychloroquine (PLAQUENIL) 200 MG tablet TAKE 1 TABLET DAILY 10/28/17   Historical Provider, MD   lisinopril (PRINIVIL;ZESTRIL) 40 MG tablet  5/31/16   Historical Provider, MD   vitamin E 400 UNIT capsule Take 400 Units by mouth

## 2020-06-25 ENCOUNTER — APPOINTMENT (OUTPATIENT)
Dept: CT IMAGING | Age: 75
DRG: 441 | End: 2020-06-25
Payer: MEDICARE

## 2020-06-25 VITALS
WEIGHT: 175.5 LBS | BODY MASS INDEX: 24.57 KG/M2 | RESPIRATION RATE: 18 BRPM | OXYGEN SATURATION: 98 % | SYSTOLIC BLOOD PRESSURE: 105 MMHG | HEIGHT: 71 IN | HEART RATE: 78 BPM | TEMPERATURE: 98.6 F | DIASTOLIC BLOOD PRESSURE: 70 MMHG

## 2020-06-25 LAB
ANION GAP SERPL CALCULATED.3IONS-SCNC: 12 MMOL/L (ref 7–16)
BUN BLDV-MCNC: 14 MG/DL (ref 8–23)
CALCIUM SERPL-MCNC: 9.4 MG/DL (ref 8.6–10.2)
CHLORIDE BLD-SCNC: 104 MMOL/L (ref 98–107)
CO2: 22 MMOL/L (ref 22–29)
CREAT SERPL-MCNC: 0.9 MG/DL (ref 0.7–1.2)
GFR AFRICAN AMERICAN: >60
GFR NON-AFRICAN AMERICAN: >60 ML/MIN/1.73
GLUCOSE BLD-MCNC: 86 MG/DL (ref 74–99)
HCT VFR BLD CALC: 36.6 % (ref 37–54)
HEMOGLOBIN: 11.7 G/DL (ref 12.5–16.5)
MCH RBC QN AUTO: 28.2 PG (ref 26–35)
MCHC RBC AUTO-ENTMCNC: 32 % (ref 32–34.5)
MCV RBC AUTO: 88.2 FL (ref 80–99.9)
PDW BLD-RTO: 14.8 FL (ref 11.5–15)
PLATELET # BLD: 399 E9/L (ref 130–450)
PMV BLD AUTO: 9.9 FL (ref 7–12)
POTASSIUM REFLEX MAGNESIUM: 4.5 MMOL/L (ref 3.5–5)
RBC # BLD: 4.15 E12/L (ref 3.8–5.8)
SODIUM BLD-SCNC: 138 MMOL/L (ref 132–146)
WBC # BLD: 10.5 E9/L (ref 4.5–11.5)

## 2020-06-25 PROCEDURE — 6370000000 HC RX 637 (ALT 250 FOR IP): Performed by: INTERNAL MEDICINE

## 2020-06-25 PROCEDURE — 36415 COLL VENOUS BLD VENIPUNCTURE: CPT

## 2020-06-25 PROCEDURE — 85027 COMPLETE CBC AUTOMATED: CPT

## 2020-06-25 PROCEDURE — 74150 CT ABDOMEN W/O CONTRAST: CPT

## 2020-06-25 PROCEDURE — 6360000002 HC RX W HCPCS: Performed by: SPECIALIST

## 2020-06-25 PROCEDURE — 99239 HOSP IP/OBS DSCHRG MGMT >30: CPT | Performed by: INTERNAL MEDICINE

## 2020-06-25 PROCEDURE — 2580000003 HC RX 258: Performed by: SPECIALIST

## 2020-06-25 PROCEDURE — 80048 BASIC METABOLIC PNL TOTAL CA: CPT

## 2020-06-25 PROCEDURE — 2580000003 HC RX 258: Performed by: INTERNAL MEDICINE

## 2020-06-25 RX ORDER — MIDAZOLAM HYDROCHLORIDE 1 MG/ML
1 INJECTION INTRAMUSCULAR; INTRAVENOUS ONCE
Status: DISCONTINUED | OUTPATIENT
Start: 2020-06-25 | End: 2020-06-25

## 2020-06-25 RX ORDER — LIDOCAINE HYDROCHLORIDE 20 MG/ML
10 INJECTION, SOLUTION INFILTRATION; PERINEURAL ONCE
Status: DISCONTINUED | OUTPATIENT
Start: 2020-06-25 | End: 2020-06-25

## 2020-06-25 RX ORDER — FENTANYL CITRATE 50 UG/ML
50 INJECTION, SOLUTION INTRAMUSCULAR; INTRAVENOUS ONCE
Status: DISCONTINUED | OUTPATIENT
Start: 2020-06-25 | End: 2020-06-25

## 2020-06-25 RX ORDER — CEFUROXIME AXETIL 500 MG/1
500 TABLET ORAL EVERY 12 HOURS SCHEDULED
Status: DISCONTINUED | OUTPATIENT
Start: 2020-06-25 | End: 2020-06-25 | Stop reason: HOSPADM

## 2020-06-25 RX ORDER — SODIUM CHLORIDE 0.9 % (FLUSH) 0.9 %
10 SYRINGE (ML) INJECTION PRN
Status: CANCELLED | OUTPATIENT
Start: 2020-06-25

## 2020-06-25 RX ORDER — CEFUROXIME AXETIL 500 MG/1
500 TABLET ORAL EVERY 12 HOURS SCHEDULED
Qty: 20 TABLET | Refills: 0 | Status: SHIPPED | OUTPATIENT
Start: 2020-06-25 | End: 2020-07-05

## 2020-06-25 RX ADMIN — PANTOPRAZOLE SODIUM 40 MG: 40 TABLET, DELAYED RELEASE ORAL at 06:54

## 2020-06-25 RX ADMIN — HYDROXYCHLOROQUINE SULFATE 200 MG: 200 TABLET ORAL at 10:02

## 2020-06-25 RX ADMIN — VITAMIN D, TAB 1000IU (100/BT) 5000 UNITS: 25 TAB at 10:01

## 2020-06-25 RX ADMIN — Medication 1000 MG: at 10:01

## 2020-06-25 RX ADMIN — WATER 1 G: 1 INJECTION INTRAMUSCULAR; INTRAVENOUS; SUBCUTANEOUS at 15:09

## 2020-06-25 RX ADMIN — Medication 400 UNITS: at 10:01

## 2020-06-25 RX ADMIN — LISINOPRIL 40 MG: 20 TABLET ORAL at 10:01

## 2020-06-25 RX ADMIN — METOPROLOL TARTRATE 25 MG: 25 TABLET, FILM COATED ORAL at 10:01

## 2020-06-25 RX ADMIN — Medication 10 ML: at 10:02

## 2020-06-25 ASSESSMENT — PAIN - FUNCTIONAL ASSESSMENT: PAIN_FUNCTIONAL_ASSESSMENT: 0-10

## 2020-06-25 ASSESSMENT — PAIN SCALES - GENERAL: PAINLEVEL_OUTOF10: 0

## 2020-06-25 NOTE — PROGRESS NOTES
Pt brought to CT, scanned and images reviewed by Dr. Lara Donahue. Dr. Lara Donahue determined there was no area to drain/biopsy. Call placed to Dr. Mittie Dakins via perfect serve, spoke with Tariq Ba, Dr. Don Cho phone number provided for return call. Call placed to floor nurse, Ria Morejon, to update.

## 2020-06-25 NOTE — PROGRESS NOTES
plan with the following corrections/ additions. The following summarizes my clinical findings and independent assessment.      Ben Abdi

## 2020-06-25 NOTE — DISCHARGE SUMMARY
otherwise stable, patient stable for discharge home this time. Discharge Exam  /70   Pulse 78   Temp 98.6 °F (37 °C) (Oral)   Resp 18   Ht 5' 11\" (1.803 m)   Wt 175 lb 8 oz (79.6 kg)   SpO2 98%   BMI 24.48 kg/m²   General Appearance: alert and oriented to person, place and time and in no acute distress  Skin: warm and dry  Head: normocephalic and atraumatic  Eyes: pupils equal, round, and reactive to light, extraocular eye movements intact, conjunctivae normal  Neck: neck supple and non tender without mass   Pulmonary/Chest: clear to auscultation bilaterally- no wheezes, rales or rhonchi, normal air movement, no respiratory distress  Cardiovascular: normal rate, normal S1 and S2 and no carotid bruits  Abdomen: soft, non-tender, non-distended, normal bowel sounds, no masses or organomegaly  Extremities: no cyanosis, no clubbing and no edema  Neurologic: no cranial nerve deficit and speech normal    No intake/output data recorded. No intake/output data recorded. Labs  Recent Labs     06/24/20  0353 06/25/20  0419    138   K 4.7 4.5    104   CO2 25 22   BUN 18 14   CREATININE 1.1 0.9   GLUCOSE 114* 86   CALCIUM 9.7 9.4   WBC 15.2* 10.5   RBC 4.37 4.15   HGB 12.2* 11.7*   HCT 39.0 36.6*   MCV 89.2 88.2   MCH 27.9 28.2   MCHC 31.3* 32.0   RDW 14.7 14.8   * 399   MPV 9.9 9.9       Imaging  Ct Abdomen Wo Contrast Additional Contrast? None    Result Date: 6/25/2020  CT-guided biopsy of the right lobe lesion was not performed due to not seen on the present study. I would recommend a follow-up CT. Biliary stent in place Pneumobilia with air seen in the gallbladder. .     Ct Abdomen Pelvis W Iv Contrast Additional Contrast? None    Result Date: 6/24/2020  1. There is inflammation around the pancreas consistent with acute pancreatitis. No fluid collection.  2.  Heterogeneous area of poor enhancement in the right hepatic lobe measuring 4.5 by 2.8 cm concerning for a possible hepatic abscess formation. Other etiologies are not excluded. Correlation with clinical information would be helpful. Follow-up imaging recommended. 3.  3.1 cm infrarenal abdominal aortic aneurysm. No rupture. Recommend abdomen/pelvis CT or MR imaging follow-up in 3 years. ASSESSMENT:   ACUTE report - The findings in this report require attention and will likely impact patient care.  This report has been electronically signed by Elizabeth Cisse MD.    Patient Instructions     Medication List      START taking these medications    cefUROXime 500 MG tablet  Commonly known as:  CEFTIN  Take 1 tablet by mouth every 12 hours for 10 days        CONTINUE taking these medications    calcium citrate 250 MG Tabs tablet  Commonly known as:  CALCITRATE     diclofenac 75 MG EC tablet  Commonly known as:  VOLTAREN     fish oil 1000 MG Caps     hydroxychloroquine 200 MG tablet  Commonly known as:  PLAQUENIL     lisinopril 40 MG tablet  Commonly known as:  PRINIVIL;ZESTRIL     magnesium 250 MG Tabs tablet  Commonly known as:  MAGNESIUM-OXIDE     METAMUCIL PLUS CALCIUM PO     metoprolol tartrate 25 MG tablet  Commonly known as:  LOPRESSOR  Take 1 tablet by mouth 2 times daily     MULTIVITAMIN ADULT PO     omeprazole 20 MG delayed release capsule  Commonly known as:  PRILOSEC     oxyCODONE 5 MG capsule     sildenafil 50 MG tablet  Commonly known as:  VIAGRA     vitamin D 1.25 MG (18774 UT) Caps capsule  Commonly known as:  ERGOCALCIFEROL     vitamin E 400 UNIT capsule           Where to Get Your Medications      These medications were sent to [address] [place] [address] [address] [facility] - F [phone]  Sukhdeep Paz [address]    Phone:  [phone]   · cefUROXime 500 MG tablet       Note that more than 30 minutes was spent in preparing discharge papers, discussing discharge with patient, medication review, etc.    Electronically signed by Juan Lees DO on 6/25/2020 at 4:21 PM

## 2020-06-25 NOTE — PROGRESS NOTES
[address] [place] [address] [facility]  Progress Note    Face to face encounter  CC: abdominal pain has improved   SUBJECTIVE:  Patient is tolerating medications. No reported adverse drug reactions. ROS: No nausea, vomiting, diarrhea. Abdominal pain better   Wife at bedside  Medications:  Scheduled Meds:   hydroxychloroquine  200 mg Oral Daily    lisinopril  40 mg Oral Daily    calcium citrate  1,000 mg Oral Daily    metoprolol tartrate  25 mg Oral BID    pantoprazole  40 mg Oral QAM AC    vitamin E  400 Units Oral Daily    Vitamin D  5,000 Units Oral Daily    sodium chloride flush  10 mL Intravenous 2 times per day    cefTRIAXone (ROCEPHIN) IV  1 g Intravenous Q24H     Continuous Infusions:  PRN Meds:sodium chloride flush, acetaminophen **OR** acetaminophen, polyethylene glycol, promethazine **OR** ondansetron, oxyCODONE **OR** oxyCODONE, morphine  OBJECTIVE:  Patient Vitals for the past 24 hrs:   BP Temp Temp src Pulse Resp SpO2 Weight   06/25/20 0945 105/70 -- -- 78 18 -- --   06/25/20 0824 (!) 149/69 -- -- 77 16 98 % --   06/25/20 0715 104/69 98.6 °F (37 °C) Oral 65 16 96 % --   06/24/20 1935 134/70 98.4 °F (36.9 °C) Oral 90 16 95 % --   06/24/20 1500 -- -- -- -- -- -- 175 lb 8 oz (79.6 kg)     Constitutional: The patient is awake, alert, and oriented. Skin: Warm and dry. No rashes were noted. Head: Eyes show round, and reactive pupils. No jaundice. Mouth: Moist mucous membranes, no ulcerations, no thrush. Neck: Supple to movements. No lymphadenopathy. Chest: No use of accessory muscles to breathe. Symmetrical expansion. Auscultation reveals no wheezing, crackles, or rhonchi. Cardiovascular: S1 and S2 are rhythmic and regular. No murmurs appreciated. Abdomen: Positive bowel sounds to auscultation. Benign to palpation. Abdominal pain better. Extremities: No clubbing, no cyanosis, no edema.   PIV    Laboratory and Tests Review:  Lab Results   Component Value Date    WBC 10.5 06/25/2020    WBC 15.2 (H) 06/24/2020    WBC 15.3 (H) 06/11/2020    HGB 11.7 (L) 06/25/2020    HCT 36.6 (L) 06/25/2020    MCV 88.2 06/25/2020     06/25/2020     Lab Results   Component Value Date    NEUTROABS 12.69 (H) 06/24/2020    NEUTROABS 13.77 (H) 06/11/2020    NEUTROABS 12.66 (H) 06/10/2020     Lab Results   Component Value Date    CRP 28.6 (H) 06/09/2020     Lab Results   Component Value Date    SEDRATE 39 (H) 06/09/2020     Lab Results   Component Value Date    ALT 30 06/24/2020    AST 29 06/24/2020    ALKPHOS 191 (H) 06/24/2020    BILITOT 0.6 06/24/2020     Lab Results   Component Value Date     06/25/2020    K 4.5 06/25/2020     06/25/2020    CO2 22 06/25/2020    BUN 14 06/25/2020    CREATININE 0.9 06/25/2020    GFRAA >60 06/25/2020    LABGLOM >60 06/25/2020    GLUCOSE 86 06/25/2020    PROT 7.2 06/24/2020    LABALBU 3.7 06/24/2020    CALCIUM 9.4 06/25/2020    BILITOT 0.6 06/24/2020    ALKPHOS 191 06/24/2020    AST 29 06/24/2020    ALT 30 06/24/2020     Radiology:      Microbiology:   6/24/2020- blood cx- no growth to date  6/8/2020- blood cx- Ecoli     ASSESSMENT:  · Right hepatic lobe abscess   · Leukocytosis   · History of cholecystitis/ cholangitis  · S/p ERCP  · E.coli bacteremia     PLAN:  · Change Ceftriaxone over to Cefuroxime  · Check cultures  · Monitor labs  · Unable to place IR drain/ or biopsy not seen on scan  · Will discuss d/c plans     Haim Chan  10:58 AM  6/25/2020     Patient seen and examined. I had a face to face encounter with the patient. Agree with exam.  Assessment and plan as outlined above and directed by me. Addition and corrections were done as deemed appropriate. My exam and plan include: I spoke with Dr. Nilesh Toledo, radiology today. The so-called hepatic abscess was gone. She could not put a needle or drain in it. The procedure was canceled. The patient feels well.   I think is reasonable to switch him back to oral cefuroxime and let him go home.    Charlene Sheppard  6/25/2020  3:54 PM

## 2020-06-29 LAB
BLOOD CULTURE, ROUTINE: NORMAL
CULTURE, BLOOD 2: NORMAL

## 2020-07-05 ENCOUNTER — HOSPITAL ENCOUNTER (OUTPATIENT)
Dept: CT IMAGING | Age: 75
Discharge: HOME | End: 2020-07-07
Payer: MEDICARE

## 2020-07-05 PROCEDURE — 6360000004 HC RX CONTRAST MEDICATION: Performed by: RADIOLOGY

## 2020-07-05 PROCEDURE — 74177 CT ABD & PELVIS W/CONTRAST: CPT

## 2020-07-05 RX ADMIN — IOPAMIDOL 110 ML: 755 INJECTION, SOLUTION INTRAVENOUS at 08:33

## 2020-07-09 ENCOUNTER — HOSPITAL ENCOUNTER (OUTPATIENT)
Age: 75
Discharge: HOME | End: 2020-07-11

## 2020-07-09 PROCEDURE — 88304 TISSUE EXAM BY PATHOLOGIST: CPT

## 2020-07-29 ENCOUNTER — HOSPITAL ENCOUNTER (OUTPATIENT)
Age: 75
Discharge: HOME | End: 2020-07-31
Payer: MEDICARE

## 2020-07-29 PROCEDURE — U0003 INFECTIOUS AGENT DETECTION BY NUCLEIC ACID (DNA OR RNA); SEVERE ACUTE RESPIRATORY SYNDROME CORONAVIRUS 2 (SARS-COV-2) (CORONAVIRUS DISEASE [COVID-19]), AMPLIFIED PROBE TECHNIQUE, MAKING USE OF HIGH THROUGHPUT TECHNOLOGIES AS DESCRIBED BY CMS-2020-01-R: HCPCS

## 2020-07-30 RX ORDER — TAMSULOSIN HYDROCHLORIDE 0.4 MG/1
0.4 CAPSULE ORAL DAILY
COMMUNITY

## 2020-07-30 NOTE — PROGRESS NOTES
Have you been tested for COVID  Yes           Have you been told you were positive for COVID No  Have you had any known exposure to someone that is positive for COVID No  Do you have a cough                   No              Do you have shortness of breath No                 Do you have a sore throat            No                Are you having chills                    No                Are you having muscle aches. No                    Please come to the hospital wearing a mask and have your significant other wear a mask as well. Both of you should check your temperature before leaving to come here,  if it is 100 or higher please call [phone] for instruction.

## 2020-07-30 NOTE — PROGRESS NOTES
[facility] PRE-ADMISSION TESTING INSTRUCTIONS    The Preadmission Testing patient is instructed accordingly using the following criteria (check applicable):    ARRIVAL INSTRUCTIONS:  [x] Parking the day of Surgery is located in the Main Entrance lot. Upon entering the door, make an immediate right to the surgery reception desk    [] [phone] is available Monday through Friday 6 am to 6 pm    [x] Bring photo ID and insurance card    [] Bring in a copy of Living will or Durable Power of  papers. [x] Please be sure to arrange for responsible adult to provide transportation to and from the hospital    [x] Please arrange for responsible adult to be with you for the 24 hour period post procedure due to having anesthesia      GENERAL INSTRUCTIONS:    [x] Nothing by mouth after midnight, including gum, candy, mints or water    [x] You may brush your teeth, but do not swallow any water    [x] Take medications as instructed with 1-2 oz of water    [x] Stop herbal supplements and vitamins 5 days prior to procedure    [x] Follow preop dosing of blood thinners per physician instructions    [] Take 1/2 dose of evening insulin, but no insulin after midnight    [] No oral diabetic medications after midnight    [] If diabetic and have low blood sugar or feel symptomatic, take 1-2oz apple juice only    [] Bring inhalers day of surgery    [] Bring C-PAP/ Bi-Pap day of surgery    [] Bring urine specimen day of surgery    [x] Shower or bath with soap, lather and rinse well, AM of Surgery, no lotion, powders or creams to surgical site    [] Follow bowel prep as instructed per surgeon    [x] No tobacco products within 24 hours of surgery     [x] No alcohol or illegal drug use within 24 hours of surgery.     [x] Jewelry, body piercing's, eyeglasses, contact lenses and dentures are not permitted into surgery (bring cases)      [x] Please do not wear any nail polish, make up or hair products on the day of surgery    [x] If not already done, you can expect a call from registration    [x] You can expect a call the business day prior to procedure to notify you if your arrival time changes    [x] If you receive a survey after surgery we would greatly appreciate your comments    [] Parent/guardian of a minor must accompany their child and remain on the premises  the entire time they are under our care     [] Pediatric patients may bring favorite toy, blanket or comfort item with them    [] A caregiver or family member must remain with the patient during their stay if they are mentally handicapped, have dementia, disoriented or unable to use a call light or would be a safety concern if left unattended    [] Please notify surgeon if you develop any illness between now and time of surgery (cold, cough, sore throat, fever, nausea, vomiting) or any signs of infections  including skin, wounds, and dental.    []  The Outpatient Pharmacy is available to fill your prescription here on your day of surgery, ask your preop nurse for details    [x] Other instructions wear comfortable clothing  EDUCATIONAL MATERIALS PROVIDED:    [] PAT Preoperative Education Packet/Booklet     [] Medication List    [] Fluoroscopy Information Pamphlet    [] Transfusion bracelet applied with instructions    [] Joint replacement video reviewed    [] Shower with soap, lather and rinse well, and use CHG wipes provided the evening before surgery as instructed

## 2020-07-31 ENCOUNTER — PREP FOR PROCEDURE (OUTPATIENT)
Dept: GASTROENTEROLOGY | Age: 75
End: 2020-07-31

## 2020-07-31 DIAGNOSIS — R10.84 GENERALIZED ABDOMINAL PAIN: Primary | ICD-10-CM

## 2020-07-31 LAB
SARS-COV-2: NOT DETECTED
SOURCE: NORMAL

## 2020-07-31 RX ORDER — SODIUM CHLORIDE 0.9 % (FLUSH) 0.9 %
10 SYRINGE (ML) INJECTION PRN
Status: CANCELLED | OUTPATIENT
Start: 2020-07-31

## 2020-07-31 RX ORDER — SODIUM CHLORIDE 9 MG/ML
INJECTION, SOLUTION INTRAVENOUS CONTINUOUS
Status: CANCELLED | OUTPATIENT
Start: 2020-07-31

## 2020-07-31 RX ORDER — SODIUM CHLORIDE 0.9 % (FLUSH) 0.9 %
10 SYRINGE (ML) INJECTION EVERY 12 HOURS SCHEDULED
Status: CANCELLED | OUTPATIENT
Start: 2020-07-31

## 2020-08-03 ENCOUNTER — HOSPITAL ENCOUNTER (OUTPATIENT)
Age: 75
Setting detail: OUTPATIENT SURGERY
Discharge: HOME | End: 2020-08-03
Attending: INTERNAL MEDICINE | Admitting: INTERNAL MEDICINE
Payer: MEDICARE

## 2020-08-03 ENCOUNTER — ANESTHESIA (OUTPATIENT)
Dept: ENDOSCOPY | Age: 75
End: 2020-08-03
Payer: MEDICARE

## 2020-08-03 ENCOUNTER — APPOINTMENT (OUTPATIENT)
Dept: GENERAL RADIOLOGY | Age: 75
End: 2020-08-03
Attending: INTERNAL MEDICINE
Payer: MEDICARE

## 2020-08-03 ENCOUNTER — ANESTHESIA EVENT (OUTPATIENT)
Dept: ENDOSCOPY | Age: 75
End: 2020-08-03
Payer: MEDICARE

## 2020-08-03 VITALS — DIASTOLIC BLOOD PRESSURE: 89 MMHG | OXYGEN SATURATION: 90 % | SYSTOLIC BLOOD PRESSURE: 137 MMHG

## 2020-08-03 VITALS
TEMPERATURE: 97.6 F | DIASTOLIC BLOOD PRESSURE: 70 MMHG | RESPIRATION RATE: 16 BRPM | SYSTOLIC BLOOD PRESSURE: 121 MMHG | WEIGHT: 175 LBS | HEIGHT: 71 IN | HEART RATE: 61 BPM | BODY MASS INDEX: 24.5 KG/M2 | OXYGEN SATURATION: 95 %

## 2020-08-03 LAB
INR BLD: 1
PROTHROMBIN TIME: 11.4 SEC (ref 9.3–12.4)

## 2020-08-03 PROCEDURE — 7100000010 HC PHASE II RECOVERY - FIRST 15 MIN: Performed by: INTERNAL MEDICINE

## 2020-08-03 PROCEDURE — 6360000004 HC RX CONTRAST MEDICATION: Performed by: INTERNAL MEDICINE

## 2020-08-03 PROCEDURE — 3609015200 HC ERCP REMOVE CALCULI/DEBRIS BILIARY/PANCREAS DUCT: Performed by: INTERNAL MEDICINE

## 2020-08-03 PROCEDURE — 6360000002 HC RX W HCPCS: Performed by: NURSE ANESTHETIST, CERTIFIED REGISTERED

## 2020-08-03 PROCEDURE — 3700000000 HC ANESTHESIA ATTENDED CARE: Performed by: INTERNAL MEDICINE

## 2020-08-03 PROCEDURE — C1769 GUIDE WIRE: HCPCS | Performed by: INTERNAL MEDICINE

## 2020-08-03 PROCEDURE — 2720000010 HC SURG SUPPLY STERILE: Performed by: INTERNAL MEDICINE

## 2020-08-03 PROCEDURE — 6360000002 HC RX W HCPCS

## 2020-08-03 PROCEDURE — 2580000003 HC RX 258: Performed by: NURSE ANESTHETIST, CERTIFIED REGISTERED

## 2020-08-03 PROCEDURE — 6360000002 HC RX W HCPCS: Performed by: INTERNAL MEDICINE

## 2020-08-03 PROCEDURE — 74330 X-RAY BILE/PANC ENDOSCOPY: CPT

## 2020-08-03 PROCEDURE — 7100000011 HC PHASE II RECOVERY - ADDTL 15 MIN: Performed by: INTERNAL MEDICINE

## 2020-08-03 PROCEDURE — 2709999900 HC NON-CHARGEABLE SUPPLY: Performed by: INTERNAL MEDICINE

## 2020-08-03 PROCEDURE — 2580000003 HC RX 258: Performed by: INTERNAL MEDICINE

## 2020-08-03 PROCEDURE — 36415 COLL VENOUS BLD VENIPUNCTURE: CPT

## 2020-08-03 PROCEDURE — 3700000001 HC ADD 15 MINUTES (ANESTHESIA): Performed by: INTERNAL MEDICINE

## 2020-08-03 PROCEDURE — 85610 PROTHROMBIN TIME: CPT

## 2020-08-03 RX ORDER — PROPOFOL 10 MG/ML
INJECTION, EMULSION INTRAVENOUS PRN
Status: DISCONTINUED | OUTPATIENT
Start: 2020-08-03 | End: 2020-08-03 | Stop reason: SDUPTHER

## 2020-08-03 RX ORDER — SODIUM CHLORIDE 0.9 % (FLUSH) 0.9 %
10 SYRINGE (ML) INJECTION EVERY 12 HOURS SCHEDULED
Status: DISCONTINUED | OUTPATIENT
Start: 2020-08-03 | End: 2020-08-03 | Stop reason: HOSPADM

## 2020-08-03 RX ORDER — SODIUM CHLORIDE 0.9 % (FLUSH) 0.9 %
10 SYRINGE (ML) INJECTION PRN
Status: DISCONTINUED | OUTPATIENT
Start: 2020-08-03 | End: 2020-08-03 | Stop reason: HOSPADM

## 2020-08-03 RX ORDER — SODIUM CHLORIDE 9 MG/ML
INJECTION, SOLUTION INTRAVENOUS CONTINUOUS PRN
Status: DISCONTINUED | OUTPATIENT
Start: 2020-08-03 | End: 2020-08-03 | Stop reason: SDUPTHER

## 2020-08-03 RX ORDER — SODIUM CHLORIDE 9 MG/ML
INJECTION, SOLUTION INTRAVENOUS CONTINUOUS
Status: DISCONTINUED | OUTPATIENT
Start: 2020-08-03 | End: 2020-08-03 | Stop reason: HOSPADM

## 2020-08-03 RX ADMIN — SODIUM CHLORIDE: 9 INJECTION, SOLUTION INTRAVENOUS at 12:43

## 2020-08-03 RX ADMIN — AMPICILLIN SODIUM AND SULBACTAM SODIUM 3 G: 2; 1 INJECTION, POWDER, FOR SOLUTION INTRAMUSCULAR; INTRAVENOUS at 12:24

## 2020-08-03 RX ADMIN — SODIUM CHLORIDE: 9 INJECTION, SOLUTION INTRAVENOUS at 12:08

## 2020-08-03 RX ADMIN — IOPAMIDOL 13 ML: 612 INJECTION, SOLUTION INTRAVENOUS at 13:41

## 2020-08-03 RX ADMIN — PROPOFOL 200 MG: 10 INJECTION, EMULSION INTRAVENOUS at 13:20

## 2020-08-03 ASSESSMENT — PAIN SCALES - GENERAL: PAINLEVEL_OUTOF10: 0

## 2020-08-03 ASSESSMENT — PAIN DESCRIPTION - PAIN TYPE: TYPE: SURGICAL PAIN

## 2020-08-03 ASSESSMENT — LIFESTYLE VARIABLES: SMOKING_STATUS: 0

## 2020-08-03 ASSESSMENT — PAIN DESCRIPTION - LOCATION: LOCATION: ABDOMEN

## 2020-08-03 NOTE — BRIEF OP NOTE
Brief Postoperative Note    Giancarlo Patrick  YOB: 1945  [id number]    Procedure:  ERCP    Anesthesia: [facility]      Surgeon:  Annita Alfaro MD      Findings: CBD: OLD STENT REMOVED BALLOON CHOLANGIOGRAPHY AND SWEEPING REVEALED A STONE WHICH WAS EXTRACTED.                                       Complications: None      Estimated blood loss: none        Kade Vega MD

## 2020-08-03 NOTE — ANESTHESIA POSTPROCEDURE EVALUATION
Department of Anesthesiology  Postprocedure Note    Patient: Gerhardt Schroeder  MRN: [id number]  YOB: 1945  Date of evaluation: 8/3/2020  Time:  2:06 PM     Procedure Summary     Date:  08/03/20 Room / Location:  [facility] / [facility]    Anesthesia Start:  9439 Anesthesia Stop:  9702    Procedure:  ERCP STENT REMOVAL (N/A ) Diagnosis:  (COMMON BILE DUCT STONE WITH MARKED DILATATION)    Surgeon:  Henry Saini MD Responsible Provider:  Elyssa Rincon DO    Anesthesia Type:  MAC ASA Status:  3          Anesthesia Type: MAC    Carlee Phase I: Carlee Score: 10    Carlee Phase II: Carlee Score: 10    Last vitals: Reviewed and per EMR flowsheets.        Anesthesia Post Evaluation    Patient location during evaluation: PACU  Patient participation: complete - patient participated  Level of consciousness: awake and alert  Airway patency: patent  Nausea & Vomiting: no nausea and no vomiting  Complications: no  Cardiovascular status: hemodynamically stable  Respiratory status: acceptable  Hydration status: euvolemic

## 2020-08-03 NOTE — ANESTHESIA PRE PROCEDURE
Department of Anesthesiology  Preprocedure Note       Name:  Zachery Morley   Age:  69 y.o.  :  1945                                          MRN:  [id number]         Date:  8/3/2020      Surgeon: Graham Rodriguez):  Lizz Armstrong MD    Procedure: Procedure(s):  ERCP STENT REMOVAL     Medications prior to admission:   Prior to Admission medications    Medication Sig Start Date End Date Taking? Authorizing Provider   tamsulosin (FLOMAX) 0.4 MG capsule Take 0.4 mg by mouth daily   Yes Historical Provider, MD   metoprolol tartrate (LOPRESSOR) 25 MG tablet Take 1 tablet by mouth 2 times daily 20  Yes Tejal Flatten, MD   hydroxychloroquine (PLAQUENIL) 200 MG tablet TAKE 1 TABLET DAILY 10/28/17  Yes Historical Provider, MD   lisinopril (PRINIVIL;ZESTRIL) 40 MG tablet  16  Yes Historical Provider, MD   vitamin E 400 UNIT capsule Take 400 Units by mouth daily   Yes Historical Provider, MD   sildenafil (VIAGRA) 50 MG tablet Take 50 mg by mouth as needed for Erectile Dysfunction   Yes Historical Provider, MD   oxyCODONE 5 MG capsule Take 5 mg by mouth every 4 hours as needed for Pain.    Yes Historical Provider, MD   diclofenac (VOLTAREN) 75 MG EC tablet Take 75 mg by mouth daily   Yes Historical Provider, MD   omeprazole (PRILOSEC) 20 MG delayed release capsule Take 20 mg by mouth daily   Yes Historical Provider, MD   Omega-3 Fatty Acids (FISH OIL) 1000 MG CAPS Take 3,000 mg by mouth daily   Yes Historical Provider, MD   Psyllium-Calcium (METAMUCIL PLUS CALCIUM PO) Take 1 tablet by mouth 2 times daily   Yes Historical Provider, MD   calcium citrate (CALCITRATE) 250 MG TABS tablet Take 1,000 mg by mouth daily   Yes Historical Provider, MD   vitamin D (ERGOCALCIFEROL) 1.25 MG (71338 UT) CAPS capsule Take 5,000 Units by mouth daily   Yes Historical Provider, MD   magnesium (MAGNESIUM-OXIDE) 250 MG TABS tablet Take 250 mg by mouth daily   Yes Historical Provider, MD   Multiple Vitamins-Minerals (MULTIVITAMIN ADULT PO) Take 1 tablet by mouth daily   Yes Historical Provider, MD       Current medications:    Current Facility-Administered Medications   Medication Dose Route Frequency Provider Last Rate Last Dose    sodium chloride flush 0.9 % injection 10 mL  10 mL Intravenous 2 times per day Daniella Bustillo MD        sodium chloride flush 0.9 % injection 10 mL  10 mL Intravenous PRN Daniella Bustillo MD        ampicillin-sulbactam (UNASYN) 3 g ivpb minibag  3 g Intravenous 60 Min Pre-Op Daniella Bustillo  mL/hr at 08/03/20 1224 3 g at 08/03/20 1224    0.9 % sodium chloride infusion   Intravenous Continuous Daniella Bustillo MD 50 mL/hr at 08/03/20 1208         Allergies:  No Known Allergies    Problem List:    Patient Active Problem List   Diagnosis Code    Acute pancreatitis K85.90    Acute pancreatitis without infection or necrosis K85.90    Hypertension I10    Arthritis M19.90    Hepatic abscess K75.0    Severe protein-calorie malnutrition ([facility] [address] E43       Past Medical History:        Diagnosis Date    Arthritis     osteoarthritis    History of pancreatitis     Hypertension     Liver abscess 06/2020    Retention of urine     patient takes flomax       Past Surgical History:        Procedure Laterality Date    BACK SURGERY  1995    disc surgery l5    ERCP N/A 6/9/2020    ERCP STONE REMOVAL performed by Daniella Bustillo MD at [facility]      2013 partial retina tear bilateral    JOINT REPLACEMENT      bilateral knees, left shoulder, right middle finger     PROSTATECTOMY  2018    radical prostatectomy    TONSILLECTOMY         Social History:    Social History     Tobacco Use    Smoking status: Former Smoker    Smokeless tobacco: Never Used   Substance Use Topics    Alcohol use: Not Currently                                Counseling given: Not Answered      Vital Signs (Current):   Vitals:    07/30/20 1602 08/03/20 1139 08/03/20 1155   BP:   (!) 145/82   Pulse:   55   Resp:   18 Detected 07/29/2020       CT ab/pelv 6/7/20  Impression           1. Findings suggest acute pancreatitis. Clinical correlation   recommended.       2. Prominent diameter of the common bile duct. No evidence of   calcified choledocholithiasis or pancreatic head mass. MRCP may be   helpful for further evaluation.       3. Nonspecific periportal edema. Some etiologies include acute   hepatitis or cholangitis.       4. Subtle fat stranding is seen surrounding the gallbladder. Clinical   correlation recommended for possibility of cholecystitis.       5. Severe diverticulosis involving sigmoid colon. No evidence of acute   diverticulitis.       ALERT:  THIS IS AN ABNORMAL REPORT     US GallBladder 6/7/20  Impression   Moderate distention of the gallbladder with out evidence of   cholecystitis. Anesthesia Evaluation  Patient summary reviewed and Nursing notes reviewed no history of anesthetic complications:   Airway: Mallampati: III  TM distance: >3 FB   Neck ROM: full  Mouth opening: > = 3 FB Dental:    (+) implants and upper dentures  Comment: Removable upper piece, instructed to remove before procedure    LOOSE UPPER TOOTH    Pulmonary:Negative Pulmonary ROS breath sounds clear to auscultation      (-) not a current smoker (former smoker, quit 2002)                          ROS comment: Former smoker    Cardiovascular:  Exercise tolerance: good (>4 METS),   (+) hypertension: mild,       ECG reviewed  Rhythm: regular  Rate: normal                 ROS comment: Narrative & Impression     Normal sinus rhythm  Normal ECG  No previous ECGs available    6/7/20     Neuro/Psych:   Negative Neuro/Psych ROS              GI/Hepatic/Renal:   (+) GERD: well controlled, liver disease:, renal disease (cr 1.4 (6/8/20)): ARF,          ROS comment: LFTs Elevated on CMP 6/8/20  Acute Pancreatitis  . Endo/Other:    (+) : arthritis: OA., .                 Abdominal:           Vascular: negative vascular ROS. Anesthesia Plan      MAC     ASA 3       Induction: intravenous. Anesthetic plan and risks discussed with patient and spouse. Plan discussed with CRNA.             [address],    8/3/2020

## 2020-08-03 NOTE — H&P
Gastroenterology      Pre-operative History and Physical      HISTORY OF PRESENT ILLNESS:    Jono Lynne is a 69 y.o. male with significant past medical history of hypertension and arthritis admitted via ED for abdominal pain, nausea, vomiting, and low grade fever. Pt reports last Friday he ate a lot of fried food for dinner. Around 7:30 PM he developed \"serious pain\" from his umbilicus to his epigastric region described as a cement block sitting on there that lasted 1.5 hours where \"the pain went crazy, then it eased up then it would come back. Around 2:30 AM I threw up and I felt better thinking maybe I had food poisoning. Went to bed, slept and on Saturday and was fairly good but then all of a sudden my temperature was up to 100.3 °F.\"  Patient states he went to urgent care and was told he had a possible blockage so he went to [facility] emergency department for evaluation where he states his labs showed an elevated WBC however no imaging was done and he was discharged home. Patient states on Sunday morning he developed the same type of pain, and by noon he was shivering, freezing, and his pain was significant pressure like a cement block on his abdomen rated 10/10. His wife took his temperature and it was 103 °F so he was brought here for evaluation. States his BMs are normally once a day and brown, he states his last BM was Pastor morning around 1030 which was a normal bowel movement, then around 11:15 AM he had a bout of diarrhea which was brown in color and has not had a BM since. Patient states his emesis was return of food, he denies any coffee-ground or hematemesis. Patient reports no hematochezia, melena, or weight loss. Patient states his last colonoscopy was about 3 years ago at [facility] with Dr. Lissett Duvall, he reportedly had polyps. Prior to that he had an EGD about 5 years ago which showed a hiatal hernia with Dr. Lissett Duvall. Admission labs: ;  ; ; Bili 4.3; Lipase 2004; BUN 26; Cr 1.5; LA 2.4; Bgl 106; tox screen + opiates; WBC 3.4; RDW 15.2; neut 96.5%; lymph 3.5%; Mono 0.00%; INR 1.3. US B RUQ - Moderate distention of the gallbladder with out evidence of cholecystitis. CT Abd/Pelvis W IV Contrast - 1. Findings suggest acute pancreatitis. Clinical correlation recommended. 2. Prominent diameter of the common bile duct. No evidence of calcified choledocholithiasis or pancreatic head mass. MRCP may be helpful for further evaluation. 3. Nonspecific periportal edema. Some etiologies include acute hepatitis or cholangitis. 4. Subtle fat stranding is seen surrounding the gallbladder. Clinical correlation recommended for possibility of cholecystitis. 5. Severe diverticulosis involving sigmoid colon. No evidence of acute diverticulitis. Consultation for acute pancreatitis. Currently, pt reports he continues to have pain entire mid upper abdomen described as pressure like a cement block sitting on his abdomen rated 8/10. Pt reports no nausea at this time. HERE FOR ERCP WITH STENT REMOVAL. HISTORY:   Past Medical History:   Diagnosis Date    Arthritis     osteoarthritis    History of pancreatitis     Hypertension     Liver abscess 06/2020    Retention of urine     patient takes flomax       PERTINENT FAMILY HISTORY:  History reviewed. No pertinent family history.     MEDICATIONS:    Current Outpatient Medications:     tamsulosin (FLOMAX) 0.4 MG capsule, Take 0.4 mg by mouth daily, Disp: , Rfl:     metoprolol tartrate (LOPRESSOR) 25 MG tablet, Take 1 tablet by mouth 2 times daily, Disp: 60 tablet, Rfl: 3    hydroxychloroquine (PLAQUENIL) 200 MG tablet, TAKE 1 TABLET DAILY, Disp: , Rfl:     lisinopril (PRINIVIL;ZESTRIL) 40 MG tablet, , Disp: , Rfl:     vitamin E 400 UNIT capsule, Take 400 Units by mouth daily, Disp: , Rfl:     sildenafil (VIAGRA) 50 MG tablet, Take 50 mg by mouth as needed for Erectile Dysfunction, Disp: , Rfl:     oxyCODONE 5 MG capsule, Take 5 mg by mouth every 4 hours as needed for Pain., Disp: , Rfl:     diclofenac (VOLTAREN) 75 MG EC tablet, Take 75 mg by mouth daily, Disp: , Rfl:     omeprazole (PRILOSEC) 20 MG delayed release capsule, Take 20 mg by mouth daily, Disp: , Rfl:     Omega-3 Fatty Acids (FISH OIL) 1000 MG CAPS, Take 3,000 mg by mouth daily, Disp: , Rfl:     Psyllium-Calcium (METAMUCIL PLUS CALCIUM PO), Take 1 tablet by mouth 2 times daily, Disp: , Rfl:     calcium citrate (CALCITRATE) 250 MG TABS tablet, Take 1,000 mg by mouth daily, Disp: , Rfl:     vitamin D (ERGOCALCIFEROL) 1.25 MG (86796 UT) CAPS capsule, Take 5,000 Units by mouth daily, Disp: , Rfl:     magnesium (MAGNESIUM-OXIDE) 250 MG TABS tablet, Take 250 mg by mouth daily, Disp: , Rfl:     Multiple Vitamins-Minerals (MULTIVITAMIN ADULT PO), Take 1 tablet by mouth daily, Disp: , Rfl:     ALLERGIES:  Patient has no known allergies. PHYSICAL EXAM/GENERAL APPEARANCE:     CONSTITUTIONAL:  awake, alert, cooperative, fatigued and ill-appearing, icteric, and appears stated age  EYES:  pupils equal, round and reactive to light, sclera icteric and conjunctiva normal  ENT:  normocephalic, oral pharynx with moist mucous membranes  LUNGS:  clear to auscultation bilaterally.   CARDIOVASCULAR:  regular rate and rhythm, no murmur noted; 2+ pulses; no edema  ABDOMEN:  Hypoactive bowel sounds, softly distended, diffusely tender to palpation > entire upper abdomen , no masses palpated, no hepatosplenomegally  MUSCULOSKELETAL:  full range of motion noted  motor strength is 5 out of 5 all extremities bilaterally  NEUROLOGIC:  Mental Status Exam:  Level of Alertness:   awake  Orientation:   person, place, time  Motor Exam:  Motor exam is symmetrical 5 out of 5 all extremities bilaterally  SKIN: jaundiced skin color, warm, and dry  OTHER SIGNIFICANT FINDINGS: None    IMPRESSION/INITIAL DIAGNOSIS:   CBD STONE WITH MARKED DILATATION        Electronically signed by Ankita Ewing MD on 8/3/2020 at 4:25 PM

## (undated) DEVICE — RETRIEVAL BALLOON CATHETER: Brand: EXTRACTOR™ PRO RX

## (undated) DEVICE — SPHINCTEROTOME: Brand: DREAMTOME™ RX 44

## (undated) DEVICE — SYSTEM BX CAP BILI RAP EXCHG CAP LOK DEV COMPATIBLE W/ OLY

## (undated) DEVICE — SPONGE GZ W4XL4IN RAYON POLY FILL CVR W/ NONWOVEN FAB

## (undated) DEVICE — GRADUATE TRIANG MEASURE 1000ML BLK PRNT

## (undated) DEVICE — BLOCK BITE 60FR RUBBER ADLT DENTAL

## (undated) DEVICE — ELECTRODE PT RET AD L9FT HI MOIST COND ADH HYDRGEL CORDED